# Patient Record
Sex: FEMALE | Race: WHITE | Employment: STUDENT | URBAN - METROPOLITAN AREA
[De-identification: names, ages, dates, MRNs, and addresses within clinical notes are randomized per-mention and may not be internally consistent; named-entity substitution may affect disease eponyms.]

---

## 2017-09-30 ENCOUNTER — OFFICE VISIT (OUTPATIENT)
Dept: URGENT CARE | Facility: URGENT CARE | Age: 22
End: 2017-09-30
Payer: COMMERCIAL

## 2017-09-30 ENCOUNTER — HOSPITAL ENCOUNTER (EMERGENCY)
Facility: CLINIC | Age: 22
Discharge: HOME | End: 2017-09-30
Attending: EMERGENCY MEDICINE | Admitting: EMERGENCY MEDICINE
Payer: COMMERCIAL

## 2017-09-30 ENCOUNTER — APPOINTMENT (OUTPATIENT)
Dept: CT IMAGING | Facility: CLINIC | Age: 22
End: 2017-09-30
Attending: EMERGENCY MEDICINE
Payer: COMMERCIAL

## 2017-09-30 VITALS
BODY MASS INDEX: 24.48 KG/M2 | HEIGHT: 62 IN | SYSTOLIC BLOOD PRESSURE: 134 MMHG | RESPIRATION RATE: 16 BRPM | DIASTOLIC BLOOD PRESSURE: 62 MMHG | OXYGEN SATURATION: 99 % | TEMPERATURE: 98.2 F | HEART RATE: 75 BPM | WEIGHT: 133 LBS

## 2017-09-30 VITALS
TEMPERATURE: 97.4 F | HEART RATE: 79 BPM | WEIGHT: 130 LBS | SYSTOLIC BLOOD PRESSURE: 116 MMHG | DIASTOLIC BLOOD PRESSURE: 70 MMHG

## 2017-09-30 DIAGNOSIS — J03.80 ACUTE BACTERIAL TONSILLITIS: ICD-10-CM

## 2017-09-30 DIAGNOSIS — J36 PERITONSILLAR ABSCESS: Primary | ICD-10-CM

## 2017-09-30 DIAGNOSIS — B96.89 ACUTE BACTERIAL TONSILLITIS: ICD-10-CM

## 2017-09-30 DIAGNOSIS — R07.0 THROAT PAIN: ICD-10-CM

## 2017-09-30 LAB
BASOPHILS # BLD AUTO: 0 10E9/L (ref 0–0.2)
BASOPHILS NFR BLD AUTO: 0.1 %
CREAT BLD-MCNC: 0.5 MG/DL (ref 0.52–1.04)
DEPRECATED S PYO AG THROAT QL EIA: NORMAL
DIFFERENTIAL METHOD BLD: ABNORMAL
EOSINOPHIL # BLD AUTO: 0.1 10E9/L (ref 0–0.7)
EOSINOPHIL NFR BLD AUTO: 0.5 %
ERYTHROCYTE [DISTWIDTH] IN BLOOD BY AUTOMATED COUNT: 13.3 % (ref 10–15)
GFR SERPL CREATININE-BSD FRML MDRD: >90 ML/MIN/1.7M2
HCT VFR BLD AUTO: 36 % (ref 35–47)
HETEROPH AB SER QL: NEGATIVE
HGB BLD-MCNC: 11.8 G/DL (ref 11.7–15.7)
LYMPHOCYTES # BLD AUTO: 2.2 10E9/L (ref 0.8–5.3)
LYMPHOCYTES NFR BLD AUTO: 12.6 %
MCH RBC QN AUTO: 29.5 PG (ref 26.5–33)
MCHC RBC AUTO-ENTMCNC: 32.8 G/DL (ref 31.5–36.5)
MCV RBC AUTO: 90 FL (ref 78–100)
MONOCYTES # BLD AUTO: 1.1 10E9/L (ref 0–1.3)
MONOCYTES NFR BLD AUTO: 6.6 %
NEUTROPHILS # BLD AUTO: 13.8 10E9/L (ref 1.6–8.3)
NEUTROPHILS NFR BLD AUTO: 80.2 %
PLATELET # BLD AUTO: 354 10E9/L (ref 150–450)
RBC # BLD AUTO: 4 10E12/L (ref 3.8–5.2)
SPECIMEN SOURCE: NORMAL
WBC # BLD AUTO: 17.2 10E9/L (ref 4–11)

## 2017-09-30 PROCEDURE — 70491 CT SOFT TISSUE NECK W/DYE: CPT

## 2017-09-30 PROCEDURE — 99285 EMERGENCY DEPT VISIT HI MDM: CPT | Mod: 25

## 2017-09-30 PROCEDURE — 10160 PNXR ASPIR ABSC HMTMA BULLA: CPT

## 2017-09-30 PROCEDURE — 96365 THER/PROPH/DIAG IV INF INIT: CPT | Mod: 59

## 2017-09-30 PROCEDURE — 25000125 ZZHC RX 250: Performed by: EMERGENCY MEDICINE

## 2017-09-30 PROCEDURE — 96375 TX/PRO/DX INJ NEW DRUG ADDON: CPT

## 2017-09-30 PROCEDURE — 86308 HETEROPHILE ANTIBODY SCREEN: CPT | Performed by: PHYSICIAN ASSISTANT

## 2017-09-30 PROCEDURE — 85025 COMPLETE CBC W/AUTO DIFF WBC: CPT | Performed by: PHYSICIAN ASSISTANT

## 2017-09-30 PROCEDURE — S0077 INJECTION, CLINDAMYCIN PHOSP: HCPCS | Performed by: EMERGENCY MEDICINE

## 2017-09-30 PROCEDURE — 36415 COLL VENOUS BLD VENIPUNCTURE: CPT | Performed by: PHYSICIAN ASSISTANT

## 2017-09-30 PROCEDURE — 25000128 H RX IP 250 OP 636: Performed by: EMERGENCY MEDICINE

## 2017-09-30 PROCEDURE — 87040 BLOOD CULTURE FOR BACTERIA: CPT | Performed by: EMERGENCY MEDICINE

## 2017-09-30 PROCEDURE — 82565 ASSAY OF CREATININE: CPT

## 2017-09-30 PROCEDURE — 87081 CULTURE SCREEN ONLY: CPT | Performed by: PHYSICIAN ASSISTANT

## 2017-09-30 PROCEDURE — 99203 OFFICE O/P NEW LOW 30 MIN: CPT | Performed by: PHYSICIAN ASSISTANT

## 2017-09-30 PROCEDURE — 87880 STREP A ASSAY W/OPTIC: CPT | Performed by: PHYSICIAN ASSISTANT

## 2017-09-30 RX ORDER — CLINDAMYCIN PHOSPHATE 900 MG/50ML
900 INJECTION, SOLUTION INTRAVENOUS ONCE
Status: COMPLETED | OUTPATIENT
Start: 2017-09-30 | End: 2017-09-30

## 2017-09-30 RX ORDER — ACETAMINOPHEN AND CODEINE PHOSPHATE 120; 12 MG/5ML; MG/5ML
5 SOLUTION ORAL EVERY 6 HOURS PRN
Qty: 70 ML | Refills: 0 | Status: SHIPPED | OUTPATIENT
Start: 2017-09-30 | End: 2018-10-15

## 2017-09-30 RX ORDER — CLINDAMYCIN HCL 300 MG
300 CAPSULE ORAL 4 TIMES DAILY
Qty: 40 CAPSULE | Refills: 0 | Status: SHIPPED | OUTPATIENT
Start: 2017-09-30 | End: 2017-10-10

## 2017-09-30 RX ORDER — IOPAMIDOL 755 MG/ML
80 INJECTION, SOLUTION INTRAVASCULAR ONCE
Status: COMPLETED | OUTPATIENT
Start: 2017-09-30 | End: 2017-09-30

## 2017-09-30 RX ORDER — DEXAMETHASONE SODIUM PHOSPHATE 10 MG/ML
10 INJECTION, SOLUTION INTRAMUSCULAR; INTRAVENOUS ONCE
Status: COMPLETED | OUTPATIENT
Start: 2017-09-30 | End: 2017-09-30

## 2017-09-30 RX ADMIN — IOPAMIDOL 80 ML: 755 INJECTION, SOLUTION INTRAVENOUS at 20:41

## 2017-09-30 RX ADMIN — SODIUM CHLORIDE 60 ML: 9 INJECTION, SOLUTION INTRAVENOUS at 20:40

## 2017-09-30 RX ADMIN — DEXAMETHASONE SODIUM PHOSPHATE 10 MG: 10 INJECTION, SOLUTION INTRAMUSCULAR; INTRAVENOUS at 19:37

## 2017-09-30 RX ADMIN — SODIUM CHLORIDE 1000 ML: 9 INJECTION, SOLUTION INTRAVENOUS at 19:37

## 2017-09-30 RX ADMIN — SODIUM CHLORIDE 1000 ML: 9 INJECTION, SOLUTION INTRAVENOUS at 21:21

## 2017-09-30 RX ADMIN — CLINDAMYCIN PHOSPHATE 900 MG: 18 INJECTION, SOLUTION INTRAVENOUS at 19:37

## 2017-09-30 ASSESSMENT — ENCOUNTER SYMPTOMS
WHEEZING: 0
FEVER: 0
VOMITING: 0
SHORTNESS OF BREATH: 0
SORE THROAT: 1
TROUBLE SWALLOWING: 1
NAUSEA: 0
ABDOMINAL PAIN: 0

## 2017-09-30 NOTE — ED AVS SNAPSHOT
Emergency Department    [address] [facility] [address]    Phone:  [phone]    Fax:  [phone]                                       Esperanza Vidales   MRN: [id number]    Department:   Emergency Department   Date of Visit:  9/30/2017           Patient Information     Date Of Birth          1995        Your diagnoses for this visit were:     Acute bacterial tonsillitis CT with possible early paratonsilar abscess as well, not able to be drained       You were seen by Vic Yuen MD.      Follow-up Information     Follow up with System, Provider Not In In 2 days.    Specialty:  Clinic    Why:  As needed, For wound re-check    Contact information:               Follow up with  Emergency Department.    Specialty:  EMERGENCY MEDICINE    Why:  If symptoms worsen    Contact information:    [address] [facility] [id number] [phone]      Discharge References/Attachments     PERITONSILLAR ABSCESS (ENGLISH)      24 Hour Appointment Hotline       To make an appointment at any [facility], call [phone] ([phone]). If you don't have a family doctor or clinic, we will help you find one. [place] clinics are conveniently located to serve the needs of you and your family.             Review of your medicines      START taking        Dose / Directions Last dose taken    acetaminophen-codeine 120-12 MG/5ML solution   Dose:  5 mL   Quantity:  70 mL        Take 5 mLs by mouth every 6 hours as needed for moderate pain   Refills:  0        clindamycin 300 MG capsule   Commonly known as:  CLEOCIN   Dose:  300 mg   Quantity:  40 capsule        Take 1 capsule (300 mg) by mouth 4 times daily for 10 days   Refills:  0          Our records show that you are taking the medicines listed below. If these are incorrect, please call your family doctor or clinic.        Dose / Directions Last dose taken    NEXPLANON 68 MG Impl   Dose:  1 each    Generic drug:  etonogestrel        1 each by Subdermal route once   Refills:  0                Prescriptions were sent or printed at these locations (2 Prescriptions)                   Other Prescriptions                Printed at Department/Unit printer (2 of 2)         clindamycin (CLEOCIN) 300 MG capsule               acetaminophen-codeine 120-12 MG/5ML solution                Procedures and tests performed during your visit     Procedure/Test Number of Times Performed    Blood culture ONE site 1    Creatinine POCT 2    Soft tissue neck CT w contrast 1      Orders Needing Specimen Collection     None      Pending Results     Date and Time Order Name Status Description    9/30/2017 1922 Blood culture ONE site In process     9/30/2017 1651 BETA STREP GROUP A CULTURE In process             Pending Culture Results     Date and Time Order Name Status Description    9/30/2017 1922 Blood culture ONE site In process     9/30/2017 1651 BETA STREP GROUP A CULTURE In process             Pending Results Instructions     If you had any lab results that were not finalized at the time of your Discharge, you can call the ED Lab Result RN at [phone]. You will be contacted by this team for any positive Lab results or changes in treatment. The nurses are available 7 days a week from 10A to 6:30P.  You can leave a message 24 hours per day and they will return your call.        Test Results From Your Hospital Stay        9/30/2017  7:43 PM         9/30/2017  9:24 PM      Narrative     CT SOFT TISSUE NECK WITH CONTRAST 9/30/2017 8:44 PM    HISTORY:  Neck pain and sore throat. Rule out abscess.    COMPARISON: None.    TECHNIQUE: CT soft tissue neck with 80 mL Isovue-370. Radiation dose  for this scan was reduced using automated exposure control, adjustment  of the mA and/or kV according to patient size, or iterative  reconstruction technique.     FINDINGS: There is a 2.1 x 2.3 cm somewhat ill-defined low-density  area in the  left peritonsillar region that has the appearance of an  early abscess or phlegmon. There are some prominent lymph nodes in  levels II, III and IV bilaterally likely reactive. Paranasal sinuses  are clear. Airways intact. Lung apices are clear.        Impression     IMPRESSION:   1. Phlegmonous change versus early peritonsillar abscess on the left.  2. Presumed reactive lymphadenopathy.         ROSA FOSTER MD         9/30/2017  8:31 PM      Component Results     Component Value Ref Range & Units Status    Creatinine 0.5 (L) 0.52 - 1.04 mg/dL Final    GFR Estimate >90 >60 mL/min/1.7m2 Final    GFR Estimate If Black >90 >60 mL/min/1.7m2 Final                Clinical Quality Measure: Blood Pressure Screening     Your blood pressure was checked while you were in the emergency department today. The last reading we obtained was  BP: 134/62 . Please read the guidelines below about what these numbers mean and what you should do about them.  If your systolic blood pressure (the top number) is less than 120 and your diastolic blood pressure (the bottom number) is less than 80, then your blood pressure is normal. There is nothing more that you need to do about it.  If your systolic blood pressure (the top number) is 120-139 or your diastolic blood pressure (the bottom number) is 80-89, your blood pressure may be higher than it should be. You should have your blood pressure rechecked within a year by a primary care provider.  If your systolic blood pressure (the top number) is 140 or greater or your diastolic blood pressure (the bottom number) is 90 or greater, you may have high blood pressure. High blood pressure is treatable, but if left untreated over time it can put you at risk for heart attack, stroke, or kidney failure. You should have your blood pressure rechecked by a primary care provider within the next 4 weeks.  If your provider in the emergency department today gave you specific instructions to follow-up with  "your doctor or provider even sooner than that, you should follow that instruction and not wait for up to 4 weeks for your follow-up visit.        Thank you for choosing [facility]       Thank you for choosing [facility] for your care. Our goal is always to provide you with excellent care. Hearing back from our patients is one way we can continue to improve our services. Please take a few minutes to complete the written survey that you may receive in the mail after you visit with us. Thank you!        [employer] Information     [facility] lets you send messages to your doctor, view your test results, renew your prescriptions, schedule appointments and more. To sign up, go to [email or web address] . Click on \"Log in\" on the left side of the screen, which will take you to the Welcome page. Then click on \"Sign up Now\" on the right side of the page.     You will be asked to enter the access code listed below, as well as some personal information. Please follow the directions to create your username and password.     Your access code is: [email or web address]  Expires: 2017  5:41 PM     Your access code will  in 90 days. If you need help or a new code, please call your [facility] clinic or [phone].        Care EveryWhere ID     This is your Care EveryWhere ID. This could be used by other organizations to access your [facility] medical records  [id number]        Equal Access to Services     [employer] [facility] : Hadgilda Kendall, waaxda steven, qaybta kaalwaldo diop. So [facility] [phone].    ATENCIÓN: Si habla español, tiene a mclean disposición servicios gratuitos de asistencia lingüística. Terence al [phone].    We comply with applicable federal civil rights laws and [place] laws. We do not discriminate on the basis of race, color, national origin, age, disability, sex, sexual orientation, or gender identity.            After Visit Summary       This is your record. " Keep this with you and show to your community pharmacist(s) and doctor(s) at your next visit.

## 2017-09-30 NOTE — NURSING NOTE
Chief Complaint   Patient presents with     Throat Pain     throat pain, pain when swallowing and left ear pain for a few days.        Initial /70  Pulse 79  Temp 97.4  F (36.3  C) (Oral)  Wt 130 lb (59 kg) There is no height or weight on file to calculate BMI.  Medication Reconciliation: complete

## 2017-09-30 NOTE — PROGRESS NOTES
SUBJECTIVE:   Esperanza Vidales is a 22 year old female presenting with a chief complaint of   1) sore throat for the past few days with left sided ear pain.  Denies any fevers or runny nose or cough  Onset of symptoms was as above.  Course of illness is worsening.    Severity moderate  Current and Associated symptoms: as above  Treatment measures tried include Tylenol/Ibuprofen.  Predisposing factors include None.    No past medical history on file.   Denies any significant PMH    There is no problem list on file for this patient.    Social History   Substance Use Topics     Smoking status: Never Smoker     Smokeless tobacco: Never Used     Alcohol use Not on file       ROS:  CONSTITUTIONAL:NEGATIVE for fever, chills, change in weight  INTEGUMENTARY/SKIN: NEGATIVE for worrisome rashes, moles or lesions  EYES: NEGATIVE for vision changes or irritation  ENT/MOUTH: as per HPI  RESP:NEGATIVE for significant cough or SOB  CV: NEGATIVE for chest pain, palpitations or peripheral edema  GI: NEGATIVE for nausea, abdominal pain, heartburn, or change in bowel habits  MUSCULOSKELETAL: NEGATIVE for significant arthralgias or myalgia    OBJECTIVE  :/70  Pulse 79  Temp 97.4  F (36.3  C) (Oral)  Wt 130 lb (59 kg)  GENERAL APPEARANCE: healthy, alert and no distress  EYES: EOMI,  PERRL, conjunctiva clear  HENT: ear canals and TM's normal.  Nose and mouth without ulcers, erythema or lesions.  Patient is unable to open her mouth more than a few of centimeters  HENT:left  tonsillar hypertrophy, tonsillar erythema and tonsillar exudate, uvula is deviated to right  NECK: supple, with left sided anterior cervical lymphadenopathy  RESP: lungs clear to auscultation - no rales, rhonchi or wheezes  CV: regular rates and rhythm, normal S1 S2, no murmur noted  ABDOMEN:  soft, nontender, no HSM or masses and bowel sounds normal  NEURO: Normal strength and tone, sensory exam grossly normal,  normal speech and mentation  SKIN: no  suspicious lesions or rashes    (J36) Peritonsillar abscess  (primary encounter diagnosis)  Comment: Discussed with Dr. Whaley at [facility]  Plan: TO [facility] ED for further evaluation and treatment.  ED informed.      (R07.0) Throat pain  Comment:   Plan: Rapid strep screen, Beta strep group A culture,        CBC with platelets and differential,         Mononucleosis screen          Patient expresses understanding and agreement with the assessment and plan as above.

## 2017-09-30 NOTE — PATIENT INSTRUCTIONS
(J36) Peritonsillar abscess  (primary encounter diagnosis)  Comment:   Plan: TO [facility] ED for further evaluation and treatment.  ED informed.      (R07.0) Throat pain  Comment:   Plan: Rapid strep screen, Beta strep group A culture,        CBC with platelets and differential,         Mononucleosis screen

## 2017-09-30 NOTE — ED AVS SNAPSHOT
Emergency Department    [address] [facility] [address]    Phone:  [phone]    Fax:  [phone]                                       Esperanza Vidales   MRN: [id number]    Department:   Emergency Department   Date of Visit:  9/30/2017           After Visit Summary Signature Page     I have received my discharge instructions, and my questions have been answered. I have discussed any challenges I see with this plan with the nurse or doctor.    ..........................................................................................................................................  Patient/Patient Representative Signature      ..........................................................................................................................................  Patient Representative Print Name and Relationship to Patient    ..................................................               ................................................  Date                                            Time    ..........................................................................................................................................  Reviewed by Signature/Title    ...................................................              ..............................................  Date                                                            Time

## 2017-09-30 NOTE — MR AVS SNAPSHOT
"              After Visit Summary   9/30/2017    Esperanza Vidales    MRN: [id number]           Patient Information     Date Of Birth          1995        Visit Information        Provider Department      9/30/2017 4:20 PM Madelyn Goel PA-C [facility]        Today's Diagnoses     Peritonsillar abscess    -  1    Throat pain          Care Instructions    (J36) Peritonsillar abscess  (primary encounter diagnosis)  Comment:   Plan: TO [facility] ED for further evaluation and treatment.  ED informed.      (R07.0) Throat pain  Comment:   Plan: Rapid strep screen, Beta strep group A culture,        CBC with platelets and differential,         Mononucleosis screen                      Follow-ups after your visit        Who to contact     If you have questions or need follow up information about today's clinic visit or your schedule please contact [facility] directly at [phone].  Normal or non-critical lab and imaging results will be communicated to you by MyChart, letter or phone within 4 business days after the clinic has received the results. If you do not hear from us within 7 days, please contact the clinic through [employer] or phone. If you have a critical or abnormal lab result, we will notify you by phone as soon as possible.  Submit refill requests through [employer] or call your pharmacy and they will forward the refill request to us. Please allow 3 business days for your refill to be completed.          Additional Information About Your Visit        [employer] Information     [employer] lets you send messages to your doctor, view your test results, renew your prescriptions, schedule appointments and more. To sign up, go to [email or web address] . Click on \"Log in\" on the left side of the screen, which will take you to the Welcome page. Then click on \"Sign up Now\" on the right side of the page.     You will be asked to enter the " access code listed below, as well as some personal information. Please follow the directions to create your username and password.     Your access code is: [email or web address]  Expires: 2017  5:41 PM     Your access code will  in 90 days. If you need help or a new code, please call your [facility] clinic or [phone].        Care EveryWhere ID     This is your Care EveryWhere ID. This could be used by other organizations to access your [facility] medical records  [id number]        Your Vitals Were     Pulse Temperature                79 97.4  F (36.3  C) (Oral)           Blood Pressure from Last 3 Encounters:   17 116/70    Weight from Last 3 Encounters:   17 130 lb (59 kg)              We Performed the Following     Beta strep group A culture     CBC with platelets and differential     Mononucleosis screen     Rapid strep screen        Primary Care Provider    None Specified       No primary provider on file.        Equal Access to Services     [facility]: Hadii mirian Kendall, waaxda ludayanna, qaybta kaalmada osmin, waldo rod . So [facility] [phone].    ATENCIÓN: Si habla español, tiene a mclean disposición servicios gratuitos de asistencia lingüística. Llame al [phone].    We comply with applicable federal civil rights laws and [place] laws. We do not discriminate on the basis of race, color, national origin, age, disability, sex, sexual orientation, or gender identity.            Thank you!     Thank you for choosing [facility]  for your care. Our goal is always to provide you with excellent care. Hearing back from our patients is one way we can continue to improve our services. Please take a few minutes to complete the written survey that you may receive in the mail after your visit with us. Thank you!             Your Updated Medication List - Protect others around you: Learn how to safely use, store and throw away  your medicines at www.disposemymeds.org.          This list is accurate as of: 9/30/17  5:41 PM.  Always use your most recent med list.                   Brand Name Dispense Instructions for use Diagnosis    NEXPLANON 68 MG Impl   Generic drug:  etonogestrel      1 each by Subdermal route once

## 2017-10-01 LAB
BACTERIA SPEC CULT: NORMAL
SPECIMEN SOURCE: NORMAL

## 2017-10-01 NOTE — ED PROVIDER NOTES
"  History     Chief Complaint:  Oral Swelling (sent from clinic for peritonsillar abscess.  States sx started 4 days ago with sore throat)      HPI   Esperanza Vidales is a 22 year old female who presents with concerns for oral swelling. For the past 4 days, the patient has been experiencing oral swelling. Earlier today the patient was seen in [facility] urgent care and was referred here. She has slight pain when turning her head as well as swallowing and talking. She denies fevers, nausea, vomiting, abdominal pain, wheezing, trouble breathing, and recent dental procedures.     Allergies:  Latex    Medications:    Nexpalnon     Past Medical History:    The patient denies any relevant past medical history.    Past Surgical History:    History reviewed. No pertinent past surgical history.    Family History:    The patient denies any relevant family medical history.    Social History:  Smoking Status: never  Smokeless Tobacco: never  Alcohol Use: yes    Marital Status:  Single [1]  Review of Systems   Constitutional: Negative for fever.   HENT: Positive for sore throat and trouble swallowing.    Respiratory: Negative for shortness of breath and wheezing.    Gastrointestinal: Negative for abdominal pain, nausea and vomiting.   All other systems reviewed and are negative.      Physical Exam   First Vitals:  BP: 134/62  Pulse: 75  Temp: 98.2  F (36.8  C)  Resp: 16  Height: 157.5 cm (5' 2\")  Weight: 60.3 kg (133 lb)  SpO2: 99 %    Physical Exam  General: Seen lying in bed, well appearing, alert and pleasant  Eyes: Tracking well, clear conj BL  ENT- Oropharynx with left greater than right tonsillar inflammation, does have associated exudate on her left tonsil as well. Does have mild area of swelling to the anterior left tonsil as well, but no fluctuance noted.  Patient has no pain to palpation of her submandibular lymph nodes, but does have mild trismus.  No evidence of airway obstruction, patient does have somewhat " muffled voice, but no stridor and no impending obstruction.  CV: No JVD, no pallor  Resp: no tachypnea, speaking in full sentences   Skin: no rashes seen, no e/o trauma  Neuro: DORSEY spontaneously          Emergency Department Course   Imaging:  Radiographic findings were communicated with the patient who voiced understanding of the findings.  CT Soft tissue neck with contrast:   IMPRESSION:   1. Phlegmonous change versus early peritonsillar abscess on the left.  2. Presumed reactive lymphadenopathy. As per radiology.    Laboratory:  Creatinine POCT: 0.5 (L)  Blood culture ONE site: in process    Procedures:  3 ASPIRATION ATTEMPTS at slightly different sights in the ant tonsillar pillar. No purulence noted.     Interventions:   Decadron, 10 mg, IV   NS, 1 L, IV   Cleocin, 900 mg, IV    Emergency Department Course:  Nursing notes and vitals reviewed. I performed an exam of the patient as documented above.     IV inserted. Medicine administered as documented above. Blood drawn. This was sent to the lab for further testing, results above.    The patient was taken for a CT of the neck, see imaging results above.      I rechecked the patient and discussed the results of her workup thus far.      I rechecked the patient and discussed the results of her workup thus far.     Findings and plan explained to the Patient. Patient discharged home with instructions regarding supportive care, medications, and reasons to return. The importance of close follow-up was reviewed. The patient was prescribed cleocin and acetaminophen-codeine.    I personally reviewed the laboratory results with the Patient and answered all related questions prior to discharge.     Impression & Plan    Medical Decision Makin y/o female who presents tot he ED with asymmetric swelling likely due to tonsillar cellulitis vs. early abscess. The patient is protecting her airway here and has received IV antibiotics and IV steroids and CT  read is phlegmonous changes vs early PTA. Unable to drain after 3 attempts in different locations and patient was very compliant with exam, leading me to believe that this may be more of a phlegmon than a PTA and will be treated accordingly. Will plan for IV therapy here and wound check in the next 2-3 days with her PCP, I do feel she is stable to try her antibiotics at home orally as she has no evidence of severe sepsis here. Normal vital signs. I do appreciate leukocytosis done at outside clinic, which is likely consistent with the patient's oral cellulitis/phlegmon. Has good support at home, knows when to come back and can follow up safely with her PCP in 48 hours.     Diagnosis:    ICD-10-CM    1. Acute bacterial tonsillitis J03.80     B96.89     CT with possible early paratonsilar abscess as well, not able to be drained       Disposition:  discharged to home    Discharge Medications:  Discharge Medication List as of 9/30/2017  9:39 PM      START taking these medications    Details   clindamycin (CLEOCIN) 300 MG capsule Take 1 capsule (300 mg) by mouth 4 times daily for 10 days, Disp-40 capsule, R-0, Local Print      acetaminophen-codeine 120-12 MG/5ML solution Take 5 mLs by mouth every 6 hours as needed for moderate pain, Disp-70 mL, R-0, Local Print             I, Jonatan Oneill, am serving as a scribe on 9/30/2017 at 7:04 PM to personally document services performed by Vic Yuen* based on my observations and the provider's statements to me.     Jonatan Oneill  9/30/2017    EMERGENCY DEPARTMENT       Vic Yuen MD  09/30/17 8801

## 2017-10-06 LAB
BACTERIA SPEC CULT: NO GROWTH
Lab: NORMAL
SPECIMEN SOURCE: NORMAL

## 2018-07-02 ENCOUNTER — TRANSFERRED RECORDS (OUTPATIENT)
Dept: HEALTH INFORMATION MANAGEMENT | Facility: CLINIC | Age: 23
End: 2018-07-02

## 2018-08-27 NOTE — TELEPHONE ENCOUNTER
FUTURE VISIT INFORMATION      FUTURE VISIT INFORMATION:    Date: 09/06/2018    Time: 9:00    Location: [facility]  REFERRAL INFORMATION:    Referring provider:  SELF    Referring providers clinic:  N/A    Reason for visit/diagnosis  TONSILLITIS    RECORDS REQUESTED FROM:       Clinic name Comments Records Status Imaging Status   [facility]                                      RECORDS STATUS

## 2018-08-27 NOTE — TELEPHONE ENCOUNTER
Phone Call:     Contact Name [facility]   Outcome CALLED CLINIC TO GET RECORDS FAXED OVER THEY NEEDED A COVER LETTER FAX TO THEM FAX# [phone]

## 2018-08-29 NOTE — TELEPHONE ENCOUNTER
Action    Action Taken JOCELYN- CLINIC CALLED THEY NOW NEED JOCELYN BEFORE THEY WILL SEND US RECORDS MAILED OUT TO PT AND LEFT HER A VM THAT WE NEED ASAP

## 2018-09-05 NOTE — TELEPHONE ENCOUNTER
Phone Call:     Contact Name CALLED PT   Outcome INFORMED HER WE ARE STILL NEEDING THE JOCELYN THAT WAS MAILED TO HER SHE WILL LOOK FOR IT AND SEND IT BACK TO US TODAY... INFORMED HER WE NEED TO GET THE RECORDS TODAY SINCE YOUR APPT IS TOMORROW AND THAT DR BLAKE WILL WANT TO REVIEW THOSE RECORDS PRIOR TO YOU COMING IN

## 2018-09-06 ENCOUNTER — OFFICE VISIT (OUTPATIENT)
Dept: OTOLARYNGOLOGY | Facility: CLINIC | Age: 23
End: 2018-09-06
Payer: COMMERCIAL

## 2018-09-06 ENCOUNTER — PRE VISIT (OUTPATIENT)
Dept: OTOLARYNGOLOGY | Facility: CLINIC | Age: 23
End: 2018-09-06

## 2018-09-06 DIAGNOSIS — J35.01 CHRONIC TONSILLITIS: Primary | ICD-10-CM

## 2018-09-06 DIAGNOSIS — J35.1 HYPERTROPHY OF TONSILS: ICD-10-CM

## 2018-09-06 ASSESSMENT — PAIN SCALES - GENERAL: PAINLEVEL: NO PAIN (0)

## 2018-09-06 NOTE — LETTER
9/6/2018       RE: Esperanza Vidales  [address]  [facility] [address]     Dear Colleague,    Thank you for referring your patient, Esperanza Vidales, to the [facility] at [facility]. Please see a copy of my visit note below.    The patient presents with a history of chronic tonsillitis and tonsil hypertrophy.  The patient has had chronic problems with these difficulties over the past two years and she recently was treated in [place] for a severe acute tonsillitis.  The patient responds to medical therapy with antibiotics, but the patient's symptoms will soon recur after the medications are completed.  The patient denies sinusitis, rhinitis, facial pain, nasal obstruction or purulent nasal discharge. The patient denies otalgia, otorrhea, eustachian tube dysfunction, ear infections, dizziness or tinnitus.     This patient is seen in consultation as a self referral to my clinic.    All other systems were reviewed and they are either negative or they are not directly pertinent to this Otolaryngology examination.      Past Medical History:    History reviewed. No pertinent past medical history.    Past Surgical History:    History reviewed. No pertinent surgical history.    Medications:      Current Outpatient Prescriptions:      acetaminophen-codeine 120-12 MG/5ML solution, Take 5 mLs by mouth every 6 hours as needed for moderate pain, Disp: 70 mL, Rfl: 0     etonogestrel (NEXPLANON) 68 MG IMPL, 1 each by Subdermal route once, Disp: , Rfl:     Allergies:    Latex    Physical Examination:    The patient is a well developed, well nourished female in no apparent distress.  She is normocephalic, atraumatic with pupils equally round and reactive to light.    Oral Cavity Examination: Norml mucosa with no masses or lesions.  The tonsils are 3+ and cryptic  Nasal Examination: Normal mucosa with no masses or lesions  Ear Examination: Ear canals clear,  tympanic membranes and middle ears normal  Neurological: Facial nerve function intact and symmetric  Integumentary: No lesions on the skin of the head or neck  Neck: No masses or lesions, no lymphadenopathy  Endocrine: Normal thyroid    Assessment and Plan:    The patient presents with a history of chronic tonsillitis and tonsillar hypertrophy.  The patient and I have discussed medical and surgical treatment alternatives. She will gargle with CREST mouth rinse gargles twice daily for two months and she will be seen again after this period to determine whether further management is indicated.         Again, thank you for allowing me to participate in the care of your patient.      Sincerely,    Leroy Khan MD

## 2018-09-06 NOTE — MR AVS SNAPSHOT
After Visit Summary   2018    Esperanza Vidales    MRN: [id number]           Patient Information     Date Of Birth          1995        Visit Information        Provider Department      2018 9:00 AM Leroy Khan MD [facility]        Today's Diagnoses     Chronic tonsillitis    -  1    Hypertrophy of tonsils          Care Instructions    The patient presents with a history of chronic tonsillitis and tonsillar hypertrophy.  The patient and I have discussed medical and surgical treatment alternatives. She will gargle with CREST mouth rinse gargles twice daily for two months and she will be seen again after this period to determine whether further management is indicated.           Follow-ups after your visit        Your next 10 appointments already scheduled     2018  8:30 AM CDT   (Arrive by 8:15 AM)   Return Visit with MD MARIA INES [facility] ([facility])    [address] [place] [id number] [phone]              Who to contact     Please call your clinic at [phone] to:    Ask questions about your health    Make or cancel appointments    Discuss your medicines    Learn about your test results    Speak to your doctor            Additional Information About Your Visit        MyChart Information     NudgeRxhart is an electronic gateway that provides easy, online access to your medical records. With [employer], you can request a clinic appointment, read your test results, renew a prescription or communicate with your care team.     To sign up for NudgeRxhart visit the website at [email or web address]   You will be asked to enter the access code listed below, as well as some personal information. Please follow the directions to create your username and password.     Your access code is: [email or web address]  Expires: 2018  6:30 AM     Your access code will   in 90 days. If you need help or a new code, please contact your [facility] or call [phone] for assistance.        Care EveryWhere ID     This is your Care EveryWhere ID. This could be used by other organizations to access your [facility] medical records  [id number]         Blood Pressure from Last 3 Encounters:   09/30/17 134/62   09/30/17 116/70    Weight from Last 3 Encounters:   09/30/17 60.3 kg (133 lb)   09/30/17 59 kg (130 lb)              Today, you had the following     No orders found for display       Primary Care Provider Fax #    Physician No Ref-Primary [phone]       No address on file        Equal Access to Services     JUANY WATTS : Ayana Kendall, yao harris, aaron kaalmada osmin, waldo rod . So [facility] [phone].    ATENCIÓN: Si habla español, tiene a mclean disposición servicios gratuitos de asistencia lingüística. Llame al [phone].    We comply with applicable federal civil rights laws and [place] laws. We do not discriminate on the basis of race, color, national origin, age, disability, sex, sexual orientation, or gender identity.            Thank you!     Thank you for choosing [facility]  for your care. Our goal is always to provide you with excellent care. Hearing back from our patients is one way we can continue to improve our services. Please take a few minutes to complete the written survey that you may receive in the mail after your visit with us. Thank you!             Your Updated Medication List - Protect others around you: Learn how to safely use, store and throw away your medicines at www.disposemymeds.org.          This list is accurate as of 9/6/18  9:29 AM.  Always use your most recent med list.                   Brand Name Dispense Instructions for use Diagnosis    acetaminophen-codeine 120-12 MG/5ML solution     70 mL    Take 5 mLs by mouth every 6 hours as  needed for moderate pain        NEXPLANON 68 MG Impl   Generic drug:  etonogestrel      1 each by Subdermal route once

## 2018-09-06 NOTE — PATIENT INSTRUCTIONS
The patient presents with a history of chronic tonsillitis and tonsillar hypertrophy.  The patient and I have discussed medical and surgical treatment alternatives. She will gargle with CREST mouth rinse gargles twice daily for two months and she will be seen again after this period to determine whether further management is indicated.

## 2018-09-06 NOTE — LETTER
Date:September 7, 2018      Patient was self referred, no letter generated. Do not send.        [facility] Information

## 2018-10-15 ENCOUNTER — HOSPITAL ENCOUNTER (EMERGENCY)
Facility: CLINIC | Age: 23
Discharge: LEFT BEFORE DISCHARGE | End: 2018-10-15
Payer: COMMERCIAL

## 2018-10-15 VITALS
HEIGHT: 62 IN | BODY MASS INDEX: 26.13 KG/M2 | RESPIRATION RATE: 16 BRPM | DIASTOLIC BLOOD PRESSURE: 67 MMHG | OXYGEN SATURATION: 100 % | SYSTOLIC BLOOD PRESSURE: 126 MMHG | WEIGHT: 142 LBS | TEMPERATURE: 98.3 F

## 2018-10-15 NOTE — ED TRIAGE NOTES
Transferred from clinic with possible peritonsillar abscess. Patient when to clinic with c/o pharyngitis and dysphagia.  Denies SOB.

## 2018-10-15 NOTE — ED NOTES
Bed: IN01  Expected date:   Expected time:   Means of arrival:   Comments:  Olena Vidales 23 yof r/o PTA from [facility] ETA 3:40 p

## 2018-10-16 ENCOUNTER — HOSPITAL ENCOUNTER (EMERGENCY)
Facility: CLINIC | Age: 23
Discharge: HOME | End: 2018-10-16
Attending: EMERGENCY MEDICINE | Admitting: EMERGENCY MEDICINE
Payer: COMMERCIAL

## 2018-10-16 VITALS
BODY MASS INDEX: 26.13 KG/M2 | RESPIRATION RATE: 18 BRPM | WEIGHT: 142 LBS | TEMPERATURE: 98.2 F | HEART RATE: 92 BPM | DIASTOLIC BLOOD PRESSURE: 72 MMHG | OXYGEN SATURATION: 98 % | SYSTOLIC BLOOD PRESSURE: 124 MMHG | HEIGHT: 62 IN

## 2018-10-16 DIAGNOSIS — J36 PERITONSILLAR ABSCESS: ICD-10-CM

## 2018-10-16 PROCEDURE — 25000128 H RX IP 250 OP 636: Performed by: EMERGENCY MEDICINE

## 2018-10-16 PROCEDURE — 25000125 ZZHC RX 250

## 2018-10-16 PROCEDURE — 99284 EMERGENCY DEPT VISIT MOD MDM: CPT | Mod: 25

## 2018-10-16 PROCEDURE — 96372 THER/PROPH/DIAG INJ SC/IM: CPT

## 2018-10-16 PROCEDURE — 10160 PNXR ASPIR ABSC HMTMA BULLA: CPT

## 2018-10-16 RX ORDER — DEXAMETHASONE SODIUM PHOSPHATE 4 MG/ML
10 INJECTION, SOLUTION INTRA-ARTICULAR; INTRALESIONAL; INTRAMUSCULAR; INTRAVENOUS; SOFT TISSUE ONCE
Status: COMPLETED | OUTPATIENT
Start: 2018-10-16 | End: 2018-10-16

## 2018-10-16 RX ORDER — CLINDAMYCIN HCL 300 MG
300 CAPSULE ORAL 4 TIMES DAILY
Qty: 28 CAPSULE | Refills: 0 | Status: SHIPPED | OUTPATIENT
Start: 2018-10-16 | End: 2018-10-23

## 2018-10-16 RX ADMIN — DEXAMETHASONE SODIUM PHOSPHATE 4 MG: 4 INJECTION, SOLUTION INTRAMUSCULAR; INTRAVENOUS at 09:44

## 2018-10-16 RX ADMIN — TOPICAL ANESTHETIC 0.5 ML: 200 SPRAY DENTAL; PERIODONTAL at 09:21

## 2018-10-16 RX ADMIN — LIDOCAINE HYDROCHLORIDE 10 ML: 20 SOLUTION ORAL; TOPICAL at 09:17

## 2018-10-16 ASSESSMENT — ENCOUNTER SYMPTOMS
VOICE CHANGE: 0
FEVER: 0
COUGH: 0

## 2018-10-16 NOTE — ED PROVIDER NOTES
"  History     Chief Complaint:  Throat pain     HPI:   The history is provided by the patient.      Esperanza Vidales is a 23 year old female who presents to the emergency department with a male  for evaluation of throat pain. The patient had onset of left-sided throat pain on Friday (4 days ago). Pain is exacerbated with eating. She presented to urgent care yesterday and was told she had a peritonsillar abscess. She was not given antibiotics, rather, was recommended to the emergency room. However due to long wait times she left before being evaluated. Here, the patient rates her pain a 7/10 in severity. She did have this problem last year and notes that it is not as bad this time. She recovered well on antibiotics and Decadron. She denies any fever, productive cough, or voice change and has no other complaints.     Allergies:  No known drug allergies      Medications:    Nexplanon      Past Medical History:    The patient does not have any past pertinent medical history.     Past Surgical History:    History reviewed. No pertinent surgical history.     Family History:    History reviewed. No pertinent family history.      Social History:  Presents with male     Tobacco use: Never smoker   Alcohol use: Occasional   Marital Status:  Single      Review of Systems   Constitutional: Negative for fever.   HENT: Negative for voice change.         Throat pain   Respiratory: Negative for cough.    All other systems reviewed and are negative.    Physical Exam     Patient Vitals for the past 24 hrs:   BP Temp Temp src Pulse Resp SpO2 Height Weight   10/16/18 0931 - - - - - 98 % - -   10/16/18 0930 124/72 - - - - - - -   10/16/18 0850 121/72 98.2  F (36.8  C) Oral 92 18 99 % 1.575 m (5' 2\") 64.4 kg (142 lb)        Physical Exam  Vitals: reviewed by me  General: Pt seen on [facility], pleasant, cooperative, and alert to conversation  Eyes: Tracking well, clear conjunctiva BL  ENT: MMM, midline " trachea.  Uvula deviated to right.  Large fluctuant mass, roughly 1-2 cm noted in the superior aspect of the anterior tonsillar pillar.  No area obstruction.  No stridor.  Lungs: No tachypnea, no accessory muscle use. No respiratory distress.   CV: Rate as above, regular rhythm.    MSK: no peripheral edema or joint effusion.  No evidence of trauma  Skin: No rash, normal turgor and temperature  Neuro: Clear speech and no facial droop.  Psych: Not RIS, no e/o AH/VH      Emergency Department Course     Procedures:    Peritonsillar Abscess Incision and Drainage Procedure Note:     Procedure:  Needle aspiration of peritonsillar abscess    Indication:  Left peritonsillar abscess    Consent:  Risks (including but not limited to: bleeding, pain, aspiration, carotid artery injury), benefits and alternatives were discussed with  patient and consent for procedure was obtained.    Timeout:  Universal protocol was followed. TIME OUT conducted just prior to starting procedure confirmed patient identity, site/side, procedure, patient position, and availability of correct equipment and implants.?  Yes    Anesthesia:  Topical anesthesia with Cetacaine spray, followed by local infiltration using viscous lidocaine as well as topical benzocaine    Performed by: Rom Yuen MD     Procedure:  Abscess site was correctly identified.  Using an 18-gauge needle with its protective covering in place (end of cover trimmed, exposing 1 cm of distal aspect of needle), the site of maximal fluctuance was entered.  Aspiration removed approximately 5 mLs of yellow, thick, purulent material.  Needle was removed and disposed appropriately.    Patient Status:  Patient tolerated the procedure well.  There were no complications.    Interventions:  0917: Lidocaine 2 % solution 10 ml   0921: Benzocaine 20 % spray 0.5 ml   0944: Decadron 4 mg IM       Emergency Department Course:  Past medical records, nursing notes, and vitals reviewed.  0903: I  performed an exam of the patient and obtained history, as documented above.    Above interventions provided.      Above procedures given.      0927: I rechecked the patient.  Findings and plan explained to the Patient and significant other. Patient discharged home with instructions regarding supportive care, medications, and reasons to return. The importance of close follow-up was reviewed.      Impression & Plan      Medical Decision Making:  Esperanza Vidales is a 23 year old female who presents to the emergency room with what appears to be a left-sided peritonsillar abscess. I believe this diagnosis is supported my clinical gestalt as well as pus that was aspirated from her anterior tonsillar pillar. She tolerated the procedure well, see procedure note above. No air way obstruction, otherwise clinically well, given Decadron here, and then a prescription for Clindamycin. She can follow safely with her primary care doctor in  48 hours for routine follow-up and knows to come back to the emergency department with any other issues. Interesting that she has had two of these in the last year, will encourage her to follow-up with her regular doctor to pursue ENT evaluation if indicated.     Diagnosis:    ICD-10-CM    1. Peritonsillar abscess J36        Disposition:  Discharged to home with plan as outlined.     Discharge Medications:  New Prescriptions    CLINDAMYCIN (CLEOCIN) 300 MG CAPSULE    Take 1 capsule (300 mg) by mouth 4 times daily for 7 days     Scribe Disclosure:   IMarty, am serving as a scribe at 9:03 AM on 10/16/2018 to document services personally performed by Vic Yuen MD based on my observations and the provider's statements to me.       Vic Yuen MD  10/16/2018    EMERGENCY DEPARTMENT       Vic Yuen MD  10/16/18 2473

## 2018-10-16 NOTE — ED AVS SNAPSHOT
Emergency Department    [address] [facility] [address]    Phone:  [phone]    Fax:  [phone]                                       Esperanza iVdales   MRN: [id number]    Department:   Emergency Department   Date of Visit:  10/16/2018           Patient Information     Date Of Birth          1995        Your diagnoses for this visit were:     Peritonsillar abscess        You were seen by Vic Yuen MD.      Follow-up Information     Follow up with No Ref-Primary, Physician. Schedule an appointment as soon as possible for a visit in 3 days.    Why:  As needed, For repeat evaluation and symptom check, For wound re-check        Follow up with  Emergency Department.    Specialty:  EMERGENCY MEDICINE    Why:  If symptoms worsen    Contact information:    [address] [facility] [id number] [phone]        Discharge Instructions         Peritonsillar Abscess    A peritonsillar abscess is a collection of pus that forms near the tonsils. It is a complication of a bacterial infection of the tonsils (tonsillitis). The abscess causes one or both tonsils to swell. The infection and swelling may spread to nearby tissues. If tissues swell enough to block the throat, the condition can become life-threatening. It is also dangerous if the abscess bursts and the infection spreads or is breathed into the lungs. The goal is to treat a peritonsillar abscess before it worsens and threatens your health.  Signs and symptoms of peritonsillar abscess    Severe sore throat (often worse on one side)    Swollen and enlarged tonsils    Fever and chills    Pain when swallowing or trouble opening the mouth    Voice changes     Drooling    Swollen or tender glands in the neck  Diagnosing peritonsillar abscess  Your healthcare provider will examine you and look inside your mouth and throat. You will be asked about your symptoms and health history. Tests or  procedures may be done as well, including those listed below.    Throat swab. This test checks for infection. It is done by wiping a sterile cotton swab in the back of the throat. The swab is then sent to a lab for study.    Blood tests. These might be done to check how your body is responding to the infection.    Ultrasound or computed tomography (CT) scans. These tests provide images of the abscess. They also help rule out other problems.    Needle aspiration. This procedure removes a sample of pus from the abscess with a needle. The sample is then sent to a lab to check for infection. In some cases, all of the pus is removed from the abscess.  Treating peritonsillar abscess  The abscess itself can be treated. Treatment of the underlying infection is also needed. Common treatments are listed below.    Medicines. Antibiotics are needed to treat the underlying infection. These may be taken by mouth or given by IV. Pain relievers may also be given, if needed.    Drainage of the abscess. A procedure may be needed to drain the pus from the abscess. Pus may be removed from the abscess with a needle (needle aspiration). Or, a small incision is made in the abscess. The pus is then drained and suctioned from the throat and mouth. This is called incision and drainage.    Tonsillectomy. This is surgery to remove the tonsils. It may be done if the abscess does not improve with medicines. It may also be done if you have frequent tonsil infections or abscesses.  Recovery and follow-up  Treating the bacterial infection generally relieves the problem. Once the infection resolves, you should recover completely. Follow up with your healthcare provider as directed. And if you develop another throat infection, see your healthcare provider promptly.  Date Last Reviewed: 11/1/2016 2000-2017 The [email or web address]. [address] [place] [address], [place] [address]. All rights reserved. This information is not intended as a substitute for  professional medical care. Always follow your healthcare professional's instructions.          Peritonsillar Abscess  You (or your child) has an abscess (collection of pus) around the tonsils. This abscess can cause severe sore throat, pain with swallowing, fever, drooling, and trouble opening the mouth.  The abscess is treated with antibiotics. These may be started using an IV (intravenous line), then continued by mouth. In most cases, the abscess will also be drained.  Home care    All of the antibiotics should be taken as prescribed until they are gone. This is true even if symptoms start to get better. This is very important to ensure that the infection goes away. This infection can lead to serious complications.    Pain medicines should be taken as directed.    To help ease pain, children older than 6 years and adults can gargle with warm salt water four times a day for the first 2 days. Dissolve 1/2 teaspoon of salt in 1 glass of hot water. Gargle with the solution then spit it out. (Ensure that children do not swallow the salt water.)    Cool liquids and soft foods may make eating easier for the first few days.  Follow-up care  Follow up with a healthcare provider or as advised within 1-2 days.   When to seek medical advice  Call the healthcare provider right away if any of the following occur:    Fever of 100.4 F (38 C) or higher after 3 days of treatment    Symptoms that get worse    Symptoms that go away and come back    Trouble swallowing liquids or taking medicine    Trouble breathing    Neck stiffness    Bleeding    Rash    Swelling or bumps in the neck  Date Last Reviewed: 10/1/2017    1392-3164 The [employer]. [address] [place] [address], [place] [address]. All rights reserved. This information is not intended as a substitute for professional medical care. Always follow your healthcare professional's instructions.          Your next 10 appointments already scheduled     Nov 01, 2018  8:30 AM CDT    (Arrive by 8:15 AM)   Return Visit with Leroy Khan MD   [facility] ([facility] [place])    [address] [facility]  4th Floor  [facility] [address] [phone]              24 Hour Appointment Hotline       To make an appointment at any [facility], call [phone] ([phone]). If you don't have a family doctor or clinic, we will help you find one. [facility] are conveniently located to serve the needs of you and your family.             Review of your medicines      START taking        Dose / Directions Last dose taken    clindamycin 300 MG capsule   Commonly known as:  CLEOCIN   Dose:  300 mg   Quantity:  28 capsule        Take 1 capsule (300 mg) by mouth 4 times daily for 7 days   Refills:  0          Our records show that you are taking the medicines listed below. If these are incorrect, please call your family doctor or clinic.        Dose / Directions Last dose taken    NEXPLANON 68 MG Impl   Dose:  1 each   Generic drug:  etonogestrel        1 each by Subdermal route once   Refills:  0                Prescriptions were sent or printed at these locations (1 Prescription)                   Other Prescriptions                Printed at Department/Unit printer (1 of 1)         clindamycin (CLEOCIN) 300 MG capsule                Orders Needing Specimen Collection     None      Pending Results     No orders found from 10/14/2018 to 10/17/2018.            Pending Culture Results     No orders found from 10/14/2018 to 10/17/2018.            Pending Results Instructions     If you had any lab results that were not finalized at the time of your Discharge, you can call the ED Lab Result RN at [phone]. You will be contacted by this team for any positive Lab results or changes in treatment. The nurses are available 7 days a week from 10A to 6:30P.  You can leave a message 24 hours per day and they will return your call.        Test Results  From Your Hospital Stay               Clinical Quality Measure: Blood Pressure Screening     Your blood pressure was checked while you were in the emergency department today. The last reading we obtained was  BP: 121/72 . Please read the guidelines below about what these numbers mean and what you should do about them.  If your systolic blood pressure (the top number) is less than 120 and your diastolic blood pressure (the bottom number) is less than 80, then your blood pressure is normal. There is nothing more that you need to do about it.  If your systolic blood pressure (the top number) is 120-139 or your diastolic blood pressure (the bottom number) is 80-89, your blood pressure may be higher than it should be. You should have your blood pressure rechecked within a year by a primary care provider.  If your systolic blood pressure (the top number) is 140 or greater or your diastolic blood pressure (the bottom number) is 90 or greater, you may have high blood pressure. High blood pressure is treatable, but if left untreated over time it can put you at risk for heart attack, stroke, or kidney failure. You should have your blood pressure rechecked by a primary care provider within the next 4 weeks.  If your provider in the emergency department today gave you specific instructions to follow-up with your doctor or provider even sooner than that, you should follow that instruction and not wait for up to 4 weeks for your follow-up visit.        Thank you for choosing [facility]       Thank you for choosing [facility] for your care. Our goal is always to provide you with excellent care. Hearing back from our patients is one way we can continue to improve our services. Please take a few minutes to complete the written survey that you may receive in the mail after you visit with us. Thank you!        [employer] Information     [employer] lets you send messages to your doctor, view your test results, renew your prescriptions, schedule  "appointments and more. To sign up, go to [email or web address] . Click on \"Log in\" on the left side of the screen, which will take you to the Welcome page. Then click on \"Sign up Now\" on the right side of the page.     You will be asked to enter the access code listed below, as well as some personal information. Please follow the directions to create your username and password.     Your access code is: [email or web address]  Expires: 2018  6:30 AM     Your access code will  in 90 days. If you need help or a new code, please call your [facility] clinic or [phone].        Care EveryWhere ID     This is your Care EveryWhere ID. This could be used by other organizations to access your [facility] medical records  [id number]        Equal Access to Services     JUANY WATTS : Ayana Kendall, yao harris, aaron solorio, waldo chang. So [facility] [phone].    ATENCIÓN: Si habla español, tiene a mclean disposición servicios gratuitos de asistencia lingüística. Llame al [phone].    We comply with applicable federal civil rights laws and [place] laws. We do not discriminate on the basis of race, color, national origin, age, disability, sex, sexual orientation, or gender identity.            After Visit Summary       This is your record. Keep this with you and show to your community pharmacist(s) and doctor(s) at your next visit.                  "

## 2018-10-16 NOTE — DISCHARGE INSTRUCTIONS
Peritonsillar Abscess    A peritonsillar abscess is a collection of pus that forms near the tonsils. It is a complication of a bacterial infection of the tonsils (tonsillitis). The abscess causes one or both tonsils to swell. The infection and swelling may spread to nearby tissues. If tissues swell enough to block the throat, the condition can become life-threatening. It is also dangerous if the abscess bursts and the infection spreads or is breathed into the lungs. The goal is to treat a peritonsillar abscess before it worsens and threatens your health.  Signs and symptoms of peritonsillar abscess    Severe sore throat (often worse on one side)    Swollen and enlarged tonsils    Fever and chills    Pain when swallowing or trouble opening the mouth    Voice changes     Drooling    Swollen or tender glands in the neck  Diagnosing peritonsillar abscess  Your healthcare provider will examine you and look inside your mouth and throat. You will be asked about your symptoms and health history. Tests or procedures may be done as well, including those listed below.    Throat swab. This test checks for infection. It is done by wiping a sterile cotton swab in the back of the throat. The swab is then sent to a lab for study.    Blood tests. These might be done to check how your body is responding to the infection.    Ultrasound or computed tomography (CT) scans. These tests provide images of the abscess. They also help rule out other problems.    Needle aspiration. This procedure removes a sample of pus from the abscess with a needle. The sample is then sent to a lab to check for infection. In some cases, all of the pus is removed from the abscess.  Treating peritonsillar abscess  The abscess itself can be treated. Treatment of the underlying infection is also needed. Common treatments are listed below.    Medicines. Antibiotics are needed to treat the underlying infection. These may be taken by mouth or given by IV. Pain  relievers may also be given, if needed.    Drainage of the abscess. A procedure may be needed to drain the pus from the abscess. Pus may be removed from the abscess with a needle (needle aspiration). Or, a small incision is made in the abscess. The pus is then drained and suctioned from the throat and mouth. This is called incision and drainage.    Tonsillectomy. This is surgery to remove the tonsils. It may be done if the abscess does not improve with medicines. It may also be done if you have frequent tonsil infections or abscesses.  Recovery and follow-up  Treating the bacterial infection generally relieves the problem. Once the infection resolves, you should recover completely. Follow up with your healthcare provider as directed. And if you develop another throat infection, see your healthcare provider promptly.  Date Last Reviewed: 11/1/2016 2000-2017 The [employer]. [address] [place] [address]. All rights reserved. This information is not intended as a substitute for professional medical care. Always follow your healthcare professional's instructions.          Peritonsillar Abscess  You (or your child) has an abscess (collection of pus) around the tonsils. This abscess can cause severe sore throat, pain with swallowing, fever, drooling, and trouble opening the mouth.  The abscess is treated with antibiotics. These may be started using an IV (intravenous line), then continued by mouth. In most cases, the abscess will also be drained.  Home care    All of the antibiotics should be taken as prescribed until they are gone. This is true even if symptoms start to get better. This is very important to ensure that the infection goes away. This infection can lead to serious complications.    Pain medicines should be taken as directed.    To help ease pain, children older than 6 years and adults can gargle with warm salt water four times a day for the first 2 days. Dissolve 1/2 teaspoon of  salt in 1 glass of hot water. Gargle with the solution then spit it out. (Ensure that children do not swallow the salt water.)    Cool liquids and soft foods may make eating easier for the first few days.  Follow-up care  Follow up with a healthcare provider or as advised within 1-2 days.   When to seek medical advice  Call the healthcare provider right away if any of the following occur:    Fever of 100.4 F (38 C) or higher after 3 days of treatment    Symptoms that get worse    Symptoms that go away and come back    Trouble swallowing liquids or taking medicine    Trouble breathing    Neck stiffness    Bleeding    Rash    Swelling or bumps in the neck  Date Last Reviewed: 10/1/2017    4495-5301 [employer] [address] [place] [address], [place] [address]. All rights reserved. This information is not intended as a substitute for professional medical care. Always follow your healthcare professional's instructions.

## 2018-10-16 NOTE — ED AVS SNAPSHOT
Emergency Department    [address] [facility] [address]    Phone:  [phone]    Fax:  [phone]                                       Esperanza Vidales   MRN: [id number]    Department:   Emergency Department   Date of Visit:  10/16/2018           After Visit Summary Signature Page     I have received my discharge instructions, and my questions have been answered. I have discussed any challenges I see with this plan with the nurse or doctor.    ..........................................................................................................................................  Patient/Patient Representative Signature      ..........................................................................................................................................  Patient Representative Print Name and Relationship to Patient    ..................................................               ................................................  Date                                   Time    ..........................................................................................................................................  Reviewed by Signature/Title    ...................................................              ..............................................  Date                                               Time          22EPIC Rev 08/18

## 2018-11-07 ENCOUNTER — TELEPHONE (OUTPATIENT)
Dept: OTOLARYNGOLOGY | Facility: CLINIC | Age: 23
End: 2018-11-07

## 2018-11-07 ENCOUNTER — OFFICE VISIT (OUTPATIENT)
Dept: URGENT CARE | Facility: URGENT CARE | Age: 23
End: 2018-11-07
Payer: COMMERCIAL

## 2018-11-07 VITALS
RESPIRATION RATE: 16 BRPM | TEMPERATURE: 98.3 F | SYSTOLIC BLOOD PRESSURE: 118 MMHG | OXYGEN SATURATION: 99 % | DIASTOLIC BLOOD PRESSURE: 84 MMHG | HEART RATE: 77 BPM

## 2018-11-07 DIAGNOSIS — R07.0 THROAT PAIN: ICD-10-CM

## 2018-11-07 DIAGNOSIS — J06.9 VIRAL URI: Primary | ICD-10-CM

## 2018-11-07 LAB
BASOPHILS # BLD AUTO: 0 10E9/L (ref 0–0.2)
BASOPHILS NFR BLD AUTO: 0.1 %
DEPRECATED S PYO AG THROAT QL EIA: NORMAL
DIFFERENTIAL METHOD BLD: NORMAL
EOSINOPHIL # BLD AUTO: 0.1 10E9/L (ref 0–0.7)
EOSINOPHIL NFR BLD AUTO: 0.9 %
ERYTHROCYTE [DISTWIDTH] IN BLOOD BY AUTOMATED COUNT: 13.1 % (ref 10–15)
HCT VFR BLD AUTO: 39.8 % (ref 35–47)
HETEROPH AB SER QL: NEGATIVE
HGB BLD-MCNC: 13.5 G/DL (ref 11.7–15.7)
LYMPHOCYTES # BLD AUTO: 1.5 10E9/L (ref 0.8–5.3)
LYMPHOCYTES NFR BLD AUTO: 15 %
MCH RBC QN AUTO: 30.3 PG (ref 26.5–33)
MCHC RBC AUTO-ENTMCNC: 33.9 G/DL (ref 31.5–36.5)
MCV RBC AUTO: 89 FL (ref 78–100)
MONOCYTES # BLD AUTO: 1 10E9/L (ref 0–1.3)
MONOCYTES NFR BLD AUTO: 9.9 %
NEUTROPHILS # BLD AUTO: 7.3 10E9/L (ref 1.6–8.3)
NEUTROPHILS NFR BLD AUTO: 74.1 %
PLATELET # BLD AUTO: 280 10E9/L (ref 150–450)
RBC # BLD AUTO: 4.45 10E12/L (ref 3.8–5.2)
SPECIMEN SOURCE: NORMAL
WBC # BLD AUTO: 9.9 10E9/L (ref 4–11)

## 2018-11-07 PROCEDURE — 86308 HETEROPHILE ANTIBODY SCREEN: CPT | Performed by: PHYSICIAN ASSISTANT

## 2018-11-07 PROCEDURE — 85025 COMPLETE CBC W/AUTO DIFF WBC: CPT | Performed by: PHYSICIAN ASSISTANT

## 2018-11-07 PROCEDURE — 99213 OFFICE O/P EST LOW 20 MIN: CPT | Performed by: PHYSICIAN ASSISTANT

## 2018-11-07 PROCEDURE — 36415 COLL VENOUS BLD VENIPUNCTURE: CPT | Performed by: PHYSICIAN ASSISTANT

## 2018-11-07 PROCEDURE — 87081 CULTURE SCREEN ONLY: CPT | Performed by: PHYSICIAN ASSISTANT

## 2018-11-07 PROCEDURE — 87880 STREP A ASSAY W/OPTIC: CPT | Performed by: FAMILY MEDICINE

## 2018-11-07 RX ORDER — IBUPROFEN 800 MG/1
800 TABLET, FILM COATED ORAL EVERY 8 HOURS PRN
Qty: 30 TABLET | Refills: 1 | Status: SHIPPED | OUTPATIENT
Start: 2018-11-07 | End: 2018-12-28

## 2018-11-07 RX ORDER — FLUTICASONE PROPIONATE 50 MCG
2 SPRAY, SUSPENSION (ML) NASAL DAILY
Qty: 1 BOTTLE | Refills: 0 | Status: SHIPPED | OUTPATIENT
Start: 2018-11-07 | End: 2018-12-12

## 2018-11-07 NOTE — PROGRESS NOTES
SUBJECTIVE:   Esperanza Vidales is a 23 year old female presenting with a chief complaint of   1) sore throat  2) sinus congestion   3) headaches  4) cough, dry    Onset of symptoms was 1 week(s) ago.  Course of illness is worsening.    Severity mild to moderate  Current and Associated symptoms: as above.       Treatment measures tried include none.  Predisposing factors include peritonsillar abscess recently.  Boyfriend has Alger    No past medical history on file.     Peritonsillar abscess    There is no problem list on file for this patient.    Social History   Substance Use Topics     Smoking status: Never Smoker     Smokeless tobacco: Never Used     Alcohol use Yes      Comment: Occasional       ROS:  CONSTITUTIONAL:NEGATIVE for fever, chills, change in weight  INTEGUMENTARY/SKIN: NEGATIVE for worrisome rashes, moles or lesions  EYES: NEGATIVE for vision changes or irritation  ENT/MOUTH: as per HPI  RESP:NEGATIVE for significant cough or SOB  CV: NEGATIVE for chest pain, palpitations or peripheral edema  GI: NEGATIVE for nausea, abdominal pain, heartburn, or change in bowel habits  MUSCULOSKELETAL: NEGATIVE for significant arthralgias or myalgia  NEURO: NEGATIVE for weakness, dizziness or paresthesias  Review of systems negative except as stated above.    OBJECTIVE  :/84  Pulse 77  Temp 98.3  F (36.8  C) (Oral)  Resp 16  SpO2 99%  GENERAL APPEARANCE: healthy, alert and no distress  EYES: EOMI,  PERRL, conjunctiva clear  HENT: ear canals and TM's normal.  Nose and mouth without ulcers, erythema or lesions  HENT: left tonsil is edematous, without erythema or exudate.    NECK: supple, nontender, no lymphadenopathy  RESP: lungs clear to auscultation - no rales, rhonchi or wheezes  CV: regular rates and rhythm, normal S1 S2, no murmur noted  ABDOMEN:  soft, nontender, no HSM or masses and bowel sounds normal  NEURO: Normal strength and tone, sensory exam grossly normal,  normal speech and  mentation  SKIN: no suspicious lesions or rashes    (J06.9) Viral URI  (primary encounter diagnosis)  Comment:   Plan: fluticasone (FLONASE) 50 MCG/ACT spray,         ibuprofen (ADVIL/MOTRIN) 800 MG tablet            (R07.0) Throat pain  Comment:   Plan: Strep, Rapid Screen, Beta strep group A         culture, Mononucleosis screen, CBC with         platelets and differential            Follow up with primary clinic and or ENT to evaluate chronic left tonsil swelling.      Patient expresses understanding and agreement with the assessment and plan as above.

## 2018-11-07 NOTE — TELEPHONE ENCOUNTER
Health Call Center    Phone Message    May a detailed message be left on voicemail: yes    Reason for Call: Other: Patient's mother called in [facility] regarding the patient's tonsils. She stated the patient was seen at a  clinic in [place] on 11/7 and she was told her tonsils are very swollen and they know they need to be removed. The first appointment  has is not until 12/13 . The patient is in a lot of pain and is wanting to end this semester at school early due to this. She is wondering if they can just schedule the surgery or what  recommends she can do for this. Please follow up with the patient or her mother. Thank you.    Action Taken: Message routed to:  [facility] ([facility] ENT

## 2018-11-07 NOTE — PATIENT INSTRUCTIONS
(J06.9) Viral URI  (primary encounter diagnosis)  Comment:   Plan: fluticasone (FLONASE) 50 MCG/ACT spray,         ibuprofen (ADVIL/MOTRIN) 800 MG tablet            (R07.0) Throat pain  Comment:   Plan: Strep, Rapid Screen, Beta strep group A         culture, Mononucleosis screen, CBC with         platelets and differential            Follow up with primary clinic and or ENT to evaluate chronic left tonsil swelling.

## 2018-11-07 NOTE — MR AVS SNAPSHOT
"              After Visit Summary   11/7/2018    Esperanza Vidales    MRN: [id number]           Patient Information     Date Of Birth          1995        Visit Information        Provider Department      11/7/2018 1:35 PM Madelyn Goel PA-C [facility]        Today's Diagnoses     Viral URI    -  1    Throat pain          Care Instructions    (J06.9) Viral URI  (primary encounter diagnosis)  Comment:   Plan: fluticasone (FLONASE) 50 MCG/ACT spray,         ibuprofen (ADVIL/MOTRIN) 800 MG tablet            (R07.0) Throat pain  Comment:   Plan: Strep, Rapid Screen, Beta strep group A         culture, Mononucleosis screen, CBC with         platelets and differential            Follow up with primary clinic and or ENT to evaluate chronic left tonsil swelling.                Follow-ups after your visit        Who to contact     If you have questions or need follow up information about today's clinic visit or your schedule please contact [facility] directly at [phone].  Normal or non-critical lab and imaging results will be communicated to you by [employer], letter or phone within 4 business days after the clinic has received the results. If you do not hear from us within 7 days, please contact the clinic through [employer] or phone. If you have a critical or abnormal lab result, we will notify you by phone as soon as possible.  Submit refill requests through Frontback or call your pharmacy and they will forward the refill request to us. Please allow 3 business days for your refill to be completed.          Additional Information About Your Visit        [employer] Information     Frontback lets you send messages to your doctor, view your test results, renew your prescriptions, schedule appointments and more. To sign up, go to [email or web address] . Click on \"Log in\" on the left side of the screen, which will take you to the Welcome page. Then " "click on \"Sign up Now\" on the right side of the page.     You will be asked to enter the access code listed below, as well as some personal information. Please follow the directions to create your username and password.     Your access code is: [email or web address]  Expires: 2018  5:30 AM     Your access code will  in 90 days. If you need help or a new code, please call your [place] clinic or [phone].        Care EveryWhere ID     This is your Care EveryWhere ID. This could be used by other organizations to access your [place] medical records  [id number]        Your Vitals Were     Pulse Temperature Respirations Pulse Oximetry          77 98.3  F (36.8  C) (Oral) 16 99%         Blood Pressure from Last 3 Encounters:   18 118/84   10/16/18 124/72   10/15/18 126/67    Weight from Last 3 Encounters:   10/16/18 142 lb (64.4 kg)   10/15/18 142 lb (64.4 kg)   17 133 lb (60.3 kg)              We Performed the Following     Beta strep group A culture     CBC with platelets and differential     Mononucleosis screen     Strep, Rapid Screen          Today's Medication Changes          These changes are accurate as of 18  3:15 PM.  If you have any questions, ask your nurse or doctor.               Start taking these medicines.        Dose/Directions    fluticasone 50 MCG/ACT spray   Commonly known as:  FLONASE   Used for:  Viral URI   Started by:  Madelyn Goel PA-C        Dose:  2 spray   Spray 2 sprays into both nostrils daily   Quantity:  1 Bottle   Refills:  0       ibuprofen 800 MG tablet   Commonly known as:  ADVIL/MOTRIN   Used for:  Viral URI   Started by:  Madelyn Goel PA-C        Dose:  800 mg   Take 1 tablet (800 mg) by mouth every 8 hours as needed for moderate pain   Quantity:  30 tablet   Refills:  1            Where to get your medicines      These medications were sent to [facility] - [place], [place] - [address] [place] [address], " [facility] [id number]     Phone:  [phone]     fluticasone 50 MCG/ACT spray    ibuprofen 800 MG tablet                Primary Care Provider Fax #    Physician No Ref-Primary [phone]       No address on file        Equal Access to Services     JUANY WATTS : Ayana mirian adame adve Kendall, waaxda luqadaha, qaybta kaalmada adeabrahamyada, waldo rizo laLolatan chang. So [facility] [phone].    ATENCIÓN: Si habla español, tiene a mclean disposición servicios gratuitos de asistencia lingüística. Llame al [phone].    We comply with applicable federal civil rights laws and [place] laws. We do not discriminate on the basis of race, color, national origin, age, disability, sex, sexual orientation, or gender identity.            Thank you!     Thank you for choosing [facility]  for your care. Our goal is always to provide you with excellent care. Hearing back from our patients is one way we can continue to improve our services. Please take a few minutes to complete the written survey that you may receive in the mail after your visit with us. Thank you!             Your Updated Medication List - Protect others around you: Learn how to safely use, store and throw away your medicines at www.disposemymeds.org.          This list is accurate as of 11/7/18  3:15 PM.  Always use your most recent med list.                   Brand Name Dispense Instructions for use Diagnosis    fluticasone 50 MCG/ACT spray    FLONASE    1 Bottle    Spray 2 sprays into both nostrils daily    Viral URI       ibuprofen 800 MG tablet    ADVIL/MOTRIN    30 tablet    Take 1 tablet (800 mg) by mouth every 8 hours as needed for moderate pain    Viral URI       NEXPLANON 68 MG Impl   Generic drug:  etonogestrel      1 each by Subdermal route once

## 2018-11-08 LAB
BACTERIA SPEC CULT: NORMAL
SPECIMEN SOURCE: NORMAL

## 2018-11-08 NOTE — TELEPHONE ENCOUNTER
Reason for call: Ester (julia, Beatriz) would like to discus options for getting Esperanza in to have tonsils removed asap.  She said it is affecting her academics.  Please call back as soon as possible to help.  2nd request     Phone number to reach patient:  Other phone number:  [phone]    Best Time:  Asap    Can we leave a detailed message on this number?  YES

## 2018-11-08 NOTE — TELEPHONE ENCOUNTER
[facility]    Phone Message    May a detailed message be left on voicemail: yes    Reason for Call: Other: Mom called back in.  Dr Jackman's first new patient appt isnt until next Thursday.  Mom would like patient to be seen sooner .  Please call mom back to discuss symptoms.       Action Taken: Message routed to:  [facility] ([facility] luis

## 2018-11-08 NOTE — TELEPHONE ENCOUNTER
Call made back to mom, consent to communicate on file.     tonsillar abscesses. Seen UC yesterday.     Call made to patient, who is reporting trouble swallowing, breathing.     ENT Symptoms             Symptoms: cc Present Absent Comment   Fever/Chills  x     Fatigue  x     Muscle Aches   x    Eye Irritation   x    Sneezing   x    Nasal Harsha/Drg   x    Sinus Pressure/Pain   x    Loss of smell   x    Dental pain   x    Sore Throat  x     Swollen Glands  x     Ear Pain/Fullness   x    Cough  x     Wheeze   x    Chest Pain   x    Shortness of breath   x    Rash   x    Other           DANISH Staples RN  Triage RN  [facility]    Patient's mother requested we call the ENT to see if the patient can get in sooner.     Call made to the patient to triage her symptoms and make sure she didn't need to go to the ED.     She does not. Not cutting off her airway at this point, no trouble swallowing.     Call made to the ENT, the earliest appointment available is November 16th at 4:30.    Was told to go to ER for trouble swallowing, breathing or airway obstruction.

## 2018-11-15 NOTE — TELEPHONE ENCOUNTER
Phone Call:     Contact Name [facility]   Outcome LEFT VM TO GET IMAGE PUSHED TO PACS

## 2018-11-15 NOTE — TELEPHONE ENCOUNTER
FUTURE VISIT INFORMATION      FUTURE VISIT INFORMATION:    Date: 11/16/2018    Time: 4:30    [place] [facility] [place]  REFERRAL INFORMATION:    Referring provider:  MEGHAN PULIDO    Referring providers clinic:  [facility]    Reason for visit/diagnosis  Tonsillitis    RECORDS REQUESTED FROM:       Clinic name Comments Records Status Imaging Status   [facility] OFFICE VISIT: 07/02/2018,07/12/2018,07/02/2018  IMAGE: CT NECK 07/02/2018 EXTERNAL YES

## 2018-11-16 ENCOUNTER — PRE VISIT (OUTPATIENT)
Dept: ORTHOPEDICS | Facility: CLINIC | Age: 23
End: 2018-11-16

## 2018-11-16 ENCOUNTER — OFFICE VISIT (OUTPATIENT)
Dept: ORTHOPEDICS | Facility: CLINIC | Age: 23
End: 2018-11-16
Payer: COMMERCIAL

## 2018-11-16 ENCOUNTER — PRE VISIT (OUTPATIENT)
Dept: OTOLARYNGOLOGY | Facility: CLINIC | Age: 23
End: 2018-11-16

## 2018-11-16 ENCOUNTER — OFFICE VISIT (OUTPATIENT)
Dept: OTOLARYNGOLOGY | Facility: CLINIC | Age: 23
End: 2018-11-16
Payer: COMMERCIAL

## 2018-11-16 VITALS — BODY MASS INDEX: 25.69 KG/M2 | HEIGHT: 63 IN | WEIGHT: 145 LBS

## 2018-11-16 VITALS
HEIGHT: 62 IN | WEIGHT: 142 LBS | HEART RATE: 101 BPM | BODY MASS INDEX: 26.13 KG/M2 | DIASTOLIC BLOOD PRESSURE: 77 MMHG | SYSTOLIC BLOOD PRESSURE: 126 MMHG

## 2018-11-16 DIAGNOSIS — J35.1 HYPERTROPHY OF TONSILS: ICD-10-CM

## 2018-11-16 DIAGNOSIS — M54.10 RADICULAR PAIN OF LEFT LOWER EXTREMITY: Primary | ICD-10-CM

## 2018-11-16 DIAGNOSIS — J35.01 CHRONIC TONSILLITIS: Primary | ICD-10-CM

## 2018-11-16 RX ORDER — METHYLPREDNISOLONE 4 MG
TABLET, DOSE PACK ORAL
Qty: 21 TABLET | Refills: 0 | Status: SHIPPED | OUTPATIENT
Start: 2018-11-16 | End: 2018-12-12

## 2018-11-16 ASSESSMENT — PAIN SCALES - GENERAL: PAINLEVEL: MODERATE PAIN (4)

## 2018-11-16 NOTE — PROGRESS NOTES
SPORTS & ORTHOPEDIC WALK-IN VISIT 11/16/2018    Primary Care Physician: Dr. Sanches to sciatica. Had problems with it in the past, calmed down for a long time but then got sick, stopped exercising, and having pain again now. Numbness/tingling down to knee. Had been seen for this in the past, got PT at [facility]. Hasn't been able to exercise due to throat abscess and other viral infections.     Reason for visit:     What part of your body is injured / painful?  left hip and left low back    What caused the injury /pain? Unsure    How long ago did your injury occur or pain begin? problem is longstanding, this episode began 2day(s)ago    What are your most bothersome symptoms? Pain, Numbness and Tingling    How would you characterize your symptom?  Shooting with certain movement, tingling and numb    What makes your symptoms better? Heat and Tylenol    What makes your symptoms worse? Walking for long times and certain Movement, laying down for a long time and trying to get up    Have you been previously seen for this problem? Yes, [facility]    Medical History:    Any recent changes to your medical history? No    Any new medication prescribed since last visit? No    Have you had surgery on this body part before? No    Social History:    Occupation: student    Handedness: Right    Exercise: 4-5 days/week    Review of Systems:    Do you have fever, chills, weight loss? No     Do you have any vision problems? No    Do you have any chest pain or edema? No    Do you have any shortness of breath or wheezing?  No    Do you have stomach problems? Yes, related to meds    Do you have any numbness or focal weakness? No    Do you have diabetes? No    Do you have problems with bleeding or clotting? No    Do you have an rashes or other skin lesions? No

## 2018-11-16 NOTE — PATIENT INSTRUCTIONS
Low back pain     What is low back pain?    Low back pain is pain and stiffness in the lower back.  It is one of the most common reasons people miss work.      How does it occur?    Low back pain is usually caused when a ligament or muscle holding a vertebra in its proper position is strained.  Vertebrae are bones that make up the spinal column through which the spinal cord passes.  When these muscles or ligaments become weak, the spine loses its ability, resulting in pain.  Because nerves reach all parts of the body from the spinal cord, back problems can lead to pain or weakness in almost any part of the body.      Low back pain can occur if your job involves lifting and carrying heavy objects, or if you spend a lot of time sitting or standing in one position or bending over.  It can be caused by a fall or by unusually strenuous exercise.  It can be brought on by the tension and stress that causes headaches in some people.  It can even be brought on by violent sneezing or coughing.      People who are overweight may have low back pain because of the added stress on their back.      Back pain may occur when the muscles, joints, bones and connective tissues in the back become inflamed as a result of an infection or an immune system problem.  Arthritic disorders as well as some congenital and degenerative conditions may cause back pain.      Back pain accompanied by loss of bladder or bowel control, difficulty moving your legs, or numbness or tingling in your arms or legs may indicate an injury to your spine and nerves, which requires immediate medical treatment.      What are the symptoms?    Symptoms include:      Pain in the back or legs    Stiffness and limited motion    The pain may be continuous or may occur in certain positions.  It may be aggravated by coughing, sneezing, bending, twisting, or straining during a bowel movement. The pain may occur in only one spot or may spread to other areas, most commonly  down the buttocks and into the back of the thigh.     A low back strain typically does not produce pain past the knee into the calf or foot.  Tingling and numbness in the calf or foot may indicate a herniated disk or pinched nerve.      How is it diagnosed?    Your healthcare provider will review your medical history and examine you.  He or she may order X-rays.  In certain situations, a myelogram, CT scan, or MRI may be ordered.    How is it treated?    The early stages of back pain with muscle spasms should be treated with ice packs for 20-30 minutes every 4 to 6 hours for the first 2 to 3 days. You m ay lie on a frozen gel pack, crushed ice, or a bag of frozen peas.    The following are always to treat low back pain:      After the initial injury, applying heat from a heating pad or hot water bottle    Resting in bed on a firm mattress.  Often it helps to lie on your back with your knees raised.  However, isome people prefer to lie on their side with their knees bent.      Taking aspirin, ibuprofen, or other anti-inflammatory medications; muscle relaxants; or other pain medications if recommended by your healthcare provider (adults aged 65 years and older should not take non-steroidal anti-inflammatory medicine for more than 7 days without their healthcare provider's approval).    Having your back massaged by a trained person    Having traction, if recommended by your provider    Wearing a belt or corset to support your back    Talking with a counselor, if your back pain is related to tension caused by emotional problems.    Beginning a program of physical therapy, or exercising on your own.  Begin a regular exercise program to gently stretch and strengthen your muscles as soon as you can.  Your healthcare provider or physical therapist can recommend exercises that will not only help you feel better but will strengthen your muscles and help avoid back trouble later.      When the pain subsides, ask your  healthcare provider about starting an exercise program such as the following:       Exercise moderately every day, using stretching and warm-up exercises suggested by your provider or physical therapist.    Exercise vigorously for about 30 minutes two or three times a week by walking, swimming, using a stationary bicycle, or doing low-impact aerobics.    Participating regularly in an exercise program will not only help your back, it will also help keep you healthier overall.     How long will the effects last?      The effects of back pain last as long as the cause exists or until your body recovers from the strain, usually a day or two but sometimes weeks.     How can I take care of myself?    In addition to the treatment described above, keep in mind these suggestions:      Use an electric heating pad on a low setting (or a hot water bottle wrapped in a towel to avoid burning yourself) for 20 to 30 minutes.  Don't let the heating pad get too hot, and don't fall asleep with it.  You could get a burn.     Try putting an ice pack wrapped in a towel on your back for 20 minutes, one to four times a day.  Set an alarm to avoid frostbite from using the ice pack too long.    Put a pillow under your knees when you are lying down.    Sleep without a pillow under your head.    Lose weight if you are overweight.    Practice good posture.  Stand with your head up, shoulders straight and chest forward, weight balanced evenly on both feet, and pelvis tucked in.     Pain is the best way to  the pace you should set in increasing your activity and exercise.  Minor discomfort, stiffness, soreness, and mild aches need not interfere with activity.  However, limit your activity temporarily if:      your symptoms return    the pain increases when you are more active    the pain increases within 24 hours after a new or higher level of activity    When can I return to my sport or activity?    The goal of rehabilitation is to return  you to your sport or activity as soon as safely possible. If you return too soon you may worsen your injury, which could lead to permanent damage.  Everyone recovers from injury at different rate.  Return to your sport will be determined by how soon your back recovers, not by how many days or weeks it has been since your injury occurred.  In general, the longer you have symptoms before you start treatment, the longer it will take to get better.      It is important that you have fully recovered from your low back pain before you return to your sport or any strenuous activity.  You must be able to have the same range of motion that you had before your injury.  You must be able to run, jump and twist without pain.      What can I do to help prevent low back pain?    You can reduce the strain on your back by doing the following:      Don't push with your arms when you move a heavy object.  Turn around and push backwards so the strain is taken by your legs.     Whenever you sit, sit in a straight-backed chair and hold your spine against the back of the chair.     Bend your knees and hips and keep your back straight when you lift a heavy object.     Avoid lifting heavy objects higher than your waist    Hold packages you carry close to your body, with your arms bent.    Use a footrest for one foot when you stand or sit in one spot for a long time.  This keeps your back straight.    Bend your knees when you bend over.    Sit close to the pedals when you drive and use your seat belt and a hard backrest or pillow.     Lie on your side with your knees bent when you sleep or rest.  It may help to put a pillow between your knees.    Put a pillow under your knees when you sleep on your back.    Raise the foot of the bed 8 inches to discourage sleeping on your stomach unless you have other problems that require that you keep your head elevated.      To rest your back, hold each of these positions for 5 minutes or longer:  Lie on  your back, bend your knees, and put pillows under your knees.    Lie on your back, put a pillow under your neck, bend your knees to a 90-degree angle, and put your lower legs and feet on a chair.    Lie on your back, bend your knees, and bring one knee up to your chest and hold it there.  Repeat with the other knee, then bring both knees to your chest.  When holding your knee to your chest, grab your thigh rather than your lower leg to avoid over flexing your knee.           Exercises that stretch and strengthen the muscles of your abdomen and spine can help prevent back problems. Strong back and abdominal muscles help you keep good posture, with your spine in its correct position.    If your muscles are tight, take a warm shower or bath before doing the exercises. Exercise on a rug or mat. Wear loose clothing. Don t wear shoes. Stop doing any exercise that causes pain until you have talked with your healthcare provider.    Ask your provider or physical therapist to help you develop an exercise program. Ask your provider how many times a week you need to do the exercises. Remember to start slowly.    Excerpts from: The Sports Medicine Patient Advisor 3rd edition. Copyright 2010 [employer].     Low Back Pain Exercises    EXERCISES  These exercises are intended only as suggestions. Be sure to check with your provider before starting the exercises.    Standing hamstring stretch: Put the heel of one leg on a stool about 15 inches high. Keep your leg straight. Lean forward, bending at the hips until you feel a mild stretch in the back of your thigh. Make sure you do not roll your shoulders or bend at the waist when doing this. You want to stretch your leg, not your lower back. Hold the stretch for 15 to 30 seconds. Repeat with each leg 3 times.    Cat and camel: Get down on your hands and knees. Let your stomach sag, allowing your back to curve downward. Hold this position for 5 seconds. Then arch your back and  hold for 5 seconds. Do 2 sets of 15.    Quadruped arm and leg raise: Get down on your hands and knees. Pull in your belly button and tighten your abdominal muscles to stiffen your spine. While keeping your abdominals tight, raise one arm and the opposite leg away from you. Hold this position for 5 seconds. Lower your arm and leg slowly and change sides. Do this 10 times on each side.    Pelvic tilt: Lie on your back with your knees bent and your feet flat on the floor. Pull your belly button in towards your spine and push your lower back into the floor, flattening your back. Hold this position for 15 seconds, then relax. Repeat 5 to 10 times.  Partial curl: Lie on your back with your knees bent and your feet flat on the floor. Draw in your abdomen and tighten your stomach muscles. With your hands stretched out in front of you, curl your upper body forward until your shoulders clear the floor. Hold this position for 3 seconds. Don't hold your breath. It helps to breathe out as you lift your shoulders. Relax back to the floor. Repeat 10 times. Build to 2 sets of 15. To challenge yourself, clasp your hands behind your head and keep your elbows out to your sides.    Gluteal stretch: Lie on your back with both knees bent. Rest your right ankle over the knee of your left leg. Grasp the thigh of the left leg and pull toward your chest. You will feel a stretch along the buttocks and possibly along the outside of your hip. Hold the stretch for 15 to 30 seconds. Then repeat the exercise with your left ankle over your right knee. Do the exercise 3 times with each leg.    Extension exercise  Lie face down on the floor for 5 minutes. If this hurts too much, lie face down with a pillow under your stomach. This should relieve your leg or back pain. When you can lie on your stomach for 5 minutes without a pillow, you can continue with Part B of this exercise.    After lying on your stomach for 5 minutes, prop yourself up on your  elbows for another 5 minutes. If you can do this without having more leg or buttock pain, you can start doing part C of this exercise.    Lie on your stomach with your hands under your shoulders. Then press down on your hands and extend your elbows while keeping your hips flat on the floor. Hold for 1 second and lower yourself to the floor. Do 3 to 5 sets of 10 repetitions. Rest for 1 minute between sets. You should have no pain in your legs when you do this, but it is normal to feel some pain in your lower back.    Do this exercise several times a day.    Side plank: Lie on your side with your legs, hips, and shoulders in a straight line. Prop yourself up onto your forearm with your elbow directly under your shoulder. Lift your hips off the floor and balance on your forearm and the outside of your foot. Try to hold this position for 15 seconds and then slowly lower your hip to the ground. Switch sides and repeat. Work up to holding for 1 minute. This exercise can be made easier by starting with your knees and hips flexed toward your chest.    EXERCISES TO AVOID     It s best to avoid the following exercises because they strain the lower back:    Exercises in which you lie on your back and raise and lower both legs together    Full sit-ups or sit-ups with straight legs    Hip twists    SPORTS AND OTHER ACTIVITIES    In addition to strengthening your back muscles, it s helpful to keep your entire body in shape. Good activities for people with back problems include:      Walking    Bicycling    Swimming    Cross-country skiing    Yoga    Ja Chi    Pilates    Some sports can hurt your back because of rough contact, twisting, sudden impact, or direct stress on your back. Sports that may be dangerous to your back include:      Football    Soccer    Volleyball    Handball    Golf    Weight lifting    Trampoline    Tobogganing    Sledding    Snowmobiling    Snowboarding    Ice hockey            Try lifting both knees to  chest as well. This is a great stretch first thing in the morning, while still in bed.

## 2018-11-16 NOTE — PROGRESS NOTES
[facility]  Orthopedics  Rajat Alvarenga,   2018     Name: Esperanza Vidales  MRN: [id number]  Age: 23 year old  : 1995  Referring provider: Referred Self     Chief Complaint: Pain of the Lower Back and Pain of the Left Hip     Date of Injury: 2018    History of Present Illness:   Esperanza Vidales is a 23 year old, female who presents today for evaluation of left leg and buttocks pain. The patient reports her pain in her left leg and buttock that onset on 2018 with no specific injury. She was diagnosed with left sided sciatica last year that resolved, but she states she got sick, stopped exercising in the last 4 weeks due to a pharyngeal abscess and the pain has returned. She localizes her pain in the posterolateral aspect of the hip/buttock that radiates down to her knee. She describes the pain as a sharp stabbing pain that shoots down her leg. Rarely past her knee, farthest to mid calf.  She experiences numbness and tingling down her leg. She states that her pain flares up and no specific movements exacerbate or alleviate her symptoms. She voices no further concerns at this time.     Review of Systems:   A 10-point review of systems was obtained and is negative except for as noted in the HPI.     Medications:   Current Outpatient Prescriptions:      etonogestrel (NEXPLANON) 68 MG IMPL, 1 each by Subdermal route once, Disp: , Rfl:      fluticasone (FLONASE) 50 MCG/ACT spray, Spray 2 sprays into both nostrils daily, Disp: 1 Bottle, Rfl: 0     ibuprofen (ADVIL/MOTRIN) 800 MG tablet, Take 1 tablet (800 mg) by mouth every 8 hours as needed for moderate pain, Disp: 30 tablet, Rfl: 1    Allergies:  Allergies   Allergen Reactions     Latex Rash     Past Medical History:  No past medical history on file.    Past Surgical History:  No past surgical history on file.     Social History:    Social History     Marital status: Single     Spouse name: N/A     Number of children: N/A     Years  "of education: N/A     Social History Main Topics     Smoking status: Never Smoker     Smokeless tobacco: Never Used     Alcohol use Yes      Comment: Occasional     Drug use: No     Sexual activity: Not Asked     Other Topics Concern     None     Social History Narrative     Family History:  No family history on file.    Physical Examination:  Blood pressure 126/77, pulse 101, height 1.575 m (5' 2\"), weight 64.4 kg (142 lb), not currently breastfeeding.  General  - normal appearance, in no obvious distress  CV  - normal peripheral perfusion  Pulm  - normal respiratory pattern, non-labored  Musculoskeletal - lumbar spine  - stance: slow to rise and sit  - inspection: normal bone and joint alignment, no obvious scoliosis  - palpation: No lumbar paraspinal tenderness. Central left gluteal tenderness to palpation.   - ROM: Pain with extension. Flexion is normal. Pain with left side bending and extension.    - strength: lower extremities 5/5 in all planes  - special tests:  (+) straight leg raise bilaterally, left side  (+) slump test, left side  Neuro  - patellar and Achilles DTRs 2+ bilaterally, no sensory or motor deficit, grossly normal coordination, normal muscle tone  Skin  - no ecchymosis, erythema, warmth, or induration, no obvious rash  Psych  - interactive, appropriate, normal mood and affect    IMAGING: Reviewed in PACS from last year.  No evidence of disc narrowing or degenerative changes of spine. Normal read.    Assessment:   23 year old, female with a history of sciatic back pain with acute episode of sciatic pain effecting left lower extremity. Given severity today and recurrent episode, MRI warranted to further investigate pathology.  Suspected disc bulge with impingement at S1.     Diagnosis: Acute left gluteal pain with radiculopathy.     Plan:   - Scheduled an MRI of the lumbar spine   - Prescribed a medrol dose selina  - Recommended heat therapy and conservative management  - Continue at home exercises " obtained last year   - Follow up with me after the MRI to discuss the results and further treatment options.     I, Rajat Alvarenga DO, have reviewed the above note and agree with the scribe's notation as written.    Scribe Disclosure:   I, Eulogio Quintanilla, am serving as a scribe to document services personally performed by Rajat Alvarenga DO at this visit, based upon the provider's statements to me. All documentation has been reviewed by the aforementioned provider prior to being entered into the official medical record.

## 2018-11-16 NOTE — LETTER
11/16/2018       RE: Esperanza Vidales  [address] [facility] [address]     Dear Colleague,    Thank you for referring your patient, Esperanza Vidales, to the [facility] SPORTS AND ORTHOPAEDIC WALK IN CLINIC at [facility]. Please see a copy of my visit note below.          SPORTS & ORTHOPEDIC WALK-IN VISIT 11/16/2018    Primary Care Physician: Dr. Sanches to sciatica. Had problems with it in the past, calmed down for a long time but then got sick, stopped exercising, and having pain again now. Numbness/tingling down to knee. Had been seen for this in the past, got PT at [facility]. Hasn't been able to exercise due to throat abscess and other viral infections.     Reason for visit:     What part of your body is injured / painful?  left hip and left low back    What caused the injury /pain? Unsure    How long ago did your injury occur or pain begin? problem is longstanding, this episode began 2day(s)ago    What are your most bothersome symptoms? Pain, Numbness and Tingling    How would you characterize your symptom?  Shooting with certain movement, tingling and numb    What makes your symptoms better? Heat and Tylenol    What makes your symptoms worse? Walking for long times and certain Movement, laying down for a long time and trying to get up    Have you been previously seen for this problem? Yes, [facility]    Medical History:    Any recent changes to your medical history? No    Any new medication prescribed since last visit? No    Have you had surgery on this body part before? No    Social History:    Occupation: student    Handedness: Right    Exercise: 4-5 days/week    Review of Systems:    Do you have fever, chills, weight loss? No     Do you have any vision problems? No    Do you have any chest pain or edema? No    Do you have any shortness of breath or wheezing?  No    Do you have stomach problems? Yes, related to meds    Do you have any numbness or focal  weakness? No    Do you have diabetes? No    Do you have problems with bleeding or clotting? No    Do you have an rashes or other skin lesions? No           [facility]  Orthopedics  Rajat Alvarenga DO  2018     Name: Esperanza Vidales  MRN: [id number]  Age: 23 year old  : 1995  Referring provider: Referred Self     Chief Complaint: Pain of the Lower Back and Pain of the Left Hip     Date of Injury: 2018    History of Present Illness:   Esperanza Vidales is a 23 year old, female who presents today for evaluation of left leg and buttocks pain. The patient reports her pain in her left leg and buttock that onset on 2018 with no specific injury. She was diagnosed with left sided sciatica last year that resolved, but she states she got sick, stopped exercising in the last 4 weeks due to a pharyngeal abscess and the pain has returned. She localizes her pain in the posterolateral aspect of the hip/buttock that radiates down to her knee. She describes the pain as a sharp stabbing pain that shoots down her leg. Rarely past her knee, farthest to mid calf.  She experiences numbness and tingling down her leg. She states that her pain flares up and no specific movements exacerbate or alleviate her symptoms. She voices no further concerns at this time.     Review of Systems:   A 10-point review of systems was obtained and is negative except for as noted in the HPI.     Medications:   Current Outpatient Prescriptions:      etonogestrel (NEXPLANON) 68 MG IMPL, 1 each by Subdermal route once, Disp: , Rfl:      fluticasone (FLONASE) 50 MCG/ACT spray, Spray 2 sprays into both nostrils daily, Disp: 1 Bottle, Rfl: 0     ibuprofen (ADVIL/MOTRIN) 800 MG tablet, Take 1 tablet (800 mg) by mouth every 8 hours as needed for moderate pain, Disp: 30 tablet, Rfl: 1    Allergies:  Allergies   Allergen Reactions     Latex Rash     Past Medical History:  No past medical history on file.    Past Surgical History:  No  "past surgical history on file.     Social History:    Social History     Marital status: Single     Spouse name: N/A     Number of children: N/A     Years of education: N/A     Social History Main Topics     Smoking status: Never Smoker     Smokeless tobacco: Never Used     Alcohol use Yes      Comment: Occasional     Drug use: No     Sexual activity: Not Asked     Other Topics Concern     None     Social History Narrative     Family History:  No family history on file.    Physical Examination:  Blood pressure 126/77, pulse 101, height 1.575 m (5' 2\"), weight 64.4 kg (142 lb), not currently breastfeeding.  General  - normal appearance, in no obvious distress  CV  - normal peripheral perfusion  Pulm  - normal respiratory pattern, non-labored  Musculoskeletal - lumbar spine  - stance: slow to rise and sit  - inspection: normal bone and joint alignment, no obvious scoliosis  - palpation: No lumbar paraspinal tenderness. Central left gluteal tenderness to palpation.   - ROM: Pain with extension. Flexion is normal. Pain with left side bending and extension.    - strength: lower extremities 5/5 in all planes  - special tests:  (+) straight leg raise bilaterally, left side  (+) slump test, left side  Neuro  - patellar and Achilles DTRs 2+ bilaterally, no sensory or motor deficit, grossly normal coordination, normal muscle tone  Skin  - no ecchymosis, erythema, warmth, or induration, no obvious rash  Psych  - interactive, appropriate, normal mood and affect    IMAGING: Reviewed in PACS from last year.  No evidence of disc narrowing or degenerative changes of spine. Normal read.    Assessment:   23 year old, female with a history of sciatic back pain with acute episode of sciatic pain effecting left lower extremity. Given severity today and recurrent episode, MRI warranted to further investigate pathology.  Suspected disc bulge with impingement at S1.     Diagnosis: Acute left gluteal pain with radiculopathy.     Plan:   - " Scheduled an MRI of the lumbar spine   - Prescribed a medrol dose selina  - Recommended heat therapy and conservative management  - Continue at home exercises obtained last year   - Follow up with me after the MRI to discuss the results and further treatment options.     I, Rajat Alvarenga DO, have reviewed the above note and agree with the scribe's notation as written.    Scribe Disclosure:   I, Eulogio Quintanilla, am serving as a scribe to document services personally performed by Rajat Alvarenga DO at this visit, based upon the provider's statements to me. All documentation has been reviewed by the aforementioned provider prior to being entered into the official medical record.      Again, thank you for allowing me to participate in the care of your patient.      Sincerely,    Rajat Alvarenga DO

## 2018-11-16 NOTE — PROGRESS NOTES
The patient presents with a history of chronic tonsillitis and tonsil hypertrophy.  The patient has had chronic problems with these difficulties over the past two years and she recently was treated in [place] for a severe acute tonsillitis and again in the past two months a severe tonsillitis with an abscess.  The patient responds to medical therapy with antibiotics, but the patient's symptoms will soon recur after the medications are completed.  The patient would like to proceed with a tonsillectomy.       All other systems were reviewed and they are either negative or they are not directly pertinent to this Otolaryngology examination.      Past Medical History:    No past medical history on file.    Past Surgical History:    No past surgical history on file.    Medications:      Current Outpatient Prescriptions:      etonogestrel (NEXPLANON) 68 MG IMPL, 1 each by Subdermal route once, Disp: , Rfl:      fluticasone (FLONASE) 50 MCG/ACT spray, Spray 2 sprays into both nostrils daily, Disp: 1 Bottle, Rfl: 0     ibuprofen (ADVIL/MOTRIN) 800 MG tablet, Take 1 tablet (800 mg) by mouth every 8 hours as needed for moderate pain, Disp: 30 tablet, Rfl: 1     methylPREDNISolone (MEDROL DOSEPAK) 4 MG tablet, Follow package instructions, Disp: 21 tablet, Rfl: 0    Allergies:    Latex    Physical Examination:    The patient is a well developed, well nourished female in no apparent distress.  She is normocephalic, atraumatic with pupils equally round and reactive to light.    Oral Cavity Examination: Norml mucosa with no masses or lesions.  The tonsils are 3+ and cryptic  Nasal Examination: Normal mucosa with no masses or lesions  Neurological: Facial nerve function intact and symmetric  Integumentary: No lesions on the skin of the head or neck    Assessment and Plan:    The patient presents with a history of chronic tonsillitis and tonsillar hypertrophy.  The patient and I have discussed medical and surgical treatment  alternatives. Due to the recurrent nature of her infections, she will be referred to Dr. Ilir Delgado for possible surgical management of the condition.       CC: Dr. Ilir Delgado       Answers for HPI/ROS submitted by the patient on 11/16/2018   PHQ-2 Score: 0

## 2018-11-16 NOTE — LETTER
11/16/2018       RE: Esperanza Vidales  [address]  [facility] [address]     Dear Colleague,    Thank you for referring your patient, Esperanza Vidales, to the [facility] EAR NOSE AND THROAT at [facility]. Please see a copy of my visit note below.    The patient presents with a history of chronic tonsillitis and tonsil hypertrophy.  The patient has had chronic problems with these difficulties over the past two years and she recently was treated in [place] for a severe acute tonsillitis and again in the past two months a severe tonsillitis with an abscess.  The patient responds to medical therapy with antibiotics, but the patient's symptoms will soon recur after the medications are completed.  The patient would like to proceed with a tonsillectomy.       All other systems were reviewed and they are either negative or they are not directly pertinent to this Otolaryngology examination.      Past Medical History:    No past medical history on file.    Past Surgical History:    No past surgical history on file.    Medications:      Current Outpatient Prescriptions:      etonogestrel (NEXPLANON) 68 MG IMPL, 1 each by Subdermal route once, Disp: , Rfl:      fluticasone (FLONASE) 50 MCG/ACT spray, Spray 2 sprays into both nostrils daily, Disp: 1 Bottle, Rfl: 0     ibuprofen (ADVIL/MOTRIN) 800 MG tablet, Take 1 tablet (800 mg) by mouth every 8 hours as needed for moderate pain, Disp: 30 tablet, Rfl: 1     methylPREDNISolone (MEDROL DOSEPAK) 4 MG tablet, Follow package instructions, Disp: 21 tablet, Rfl: 0    Allergies:    Latex    Physical Examination:    The patient is a well developed, well nourished female in no apparent distress.  She is normocephalic, atraumatic with pupils equally round and reactive to light.    Oral Cavity Examination: Norml mucosa with no masses or lesions.  The tonsils are 3+ and cryptic  Nasal Examination: Normal mucosa with no masses or  lesions  Neurological: Facial nerve function intact and symmetric  Integumentary: No lesions on the skin of the head or neck    Assessment and Plan:    The patient presents with a history of chronic tonsillitis and tonsillar hypertrophy.  The patient and I have discussed medical and surgical treatment alternatives. Due to the recurrent nature of her infections, she will be referred to Dr. Ilir Delgado for possible surgical management of the condition.       Answers for HPI/ROS submitted by the patient on 11/16/2018   PHQ-2 Score: 0      Leroy Khan MD      CC: Dr. Ilir Delgado

## 2018-11-16 NOTE — NURSING NOTE
"Chief Complaint   Patient presents with     RECHECK     tonsillitis    \Height 1.61 m (5' 3.39\"), weight 65.8 kg (145 lb), not currently breastfeeding.    Nabil Yusuf LPN    "

## 2018-11-16 NOTE — MR AVS SNAPSHOT
After Visit Summary   11/16/2018    Esperanza Vidales    MRN: [id number]           Patient Information     Date Of Birth          1995        Visit Information        Provider Department      11/16/2018 2:30 PM Rajat Alvarenga [facility]        Today's Diagnoses     Radicular pain of left lower extremity    -  1      Care Instructions    Low back pain     What is low back pain?    Low back pain is pain and stiffness in the lower back.  It is one of the most common reasons people miss work.      How does it occur?    Low back pain is usually caused when a ligament or muscle holding a vertebra in its proper position is strained.  Vertebrae are bones that make up the spinal column through which the spinal cord passes.  When these muscles or ligaments become weak, the spine loses its ability, resulting in pain.  Because nerves reach all parts of the body from the spinal cord, back problems can lead to pain or weakness in almost any part of the body.      Low back pain can occur if your job involves lifting and carrying heavy objects, or if you spend a lot of time sitting or standing in one position or bending over.  It can be caused by a fall or by unusually strenuous exercise.  It can be brought on by the tension and stress that causes headaches in some people.  It can even be brought on by violent sneezing or coughing.      People who are overweight may have low back pain because of the added stress on their back.      Back pain may occur when the muscles, joints, bones and connective tissues in the back become inflamed as a result of an infection or an immune system problem.  Arthritic disorders as well as some congenital and degenerative conditions may cause back pain.      Back pain accompanied by loss of bladder or bowel control, difficulty moving your legs, or numbness or tingling in your arms or legs may indicate an injury to your spine and nerves,  which requires immediate medical treatment.      What are the symptoms?    Symptoms include:      Pain in the back or legs    Stiffness and limited motion    The pain may be continuous or may occur in certain positions.  It may be aggravated by coughing, sneezing, bending, twisting, or straining during a bowel movement. The pain may occur in only one spot or may spread to other areas, most commonly down the buttocks and into the back of the thigh.     A low back strain typically does not produce pain past the knee into the calf or foot.  Tingling and numbness in the calf or foot may indicate a herniated disk or pinched nerve.      How is it diagnosed?    Your healthcare provider will review your medical history and examine you.  He or she may order X-rays.  In certain situations, a myelogram, CT scan, or MRI may be ordered.    How is it treated?    The early stages of back pain with muscle spasms should be treated with ice packs for 20-30 minutes every 4 to 6 hours for the first 2 to 3 days. You m ay lie on a frozen gel pack, crushed ice, or a bag of frozen peas.    The following are always to treat low back pain:      After the initial injury, applying heat from a heating pad or hot water bottle    Resting in bed on a firm mattress.  Often it helps to lie on your back with your knees raised.  However, isome people prefer to lie on their side with their knees bent.      Taking aspirin, ibuprofen, or other anti-inflammatory medications; muscle relaxants; or other pain medications if recommended by your healthcare provider (adults aged 65 years and older should not take non-steroidal anti-inflammatory medicine for more than 7 days without their healthcare provider's approval).    Having your back massaged by a trained person    Having traction, if recommended by your provider    Wearing a belt or corset to support your back    Talking with a counselor, if your back pain is related to tension caused by emotional  problems.    Beginning a program of physical therapy, or exercising on your own.  Begin a regular exercise program to gently stretch and strengthen your muscles as soon as you can.  Your healthcare provider or physical therapist can recommend exercises that will not only help you feel better but will strengthen your muscles and help avoid back trouble later.      When the pain subsides, ask your healthcare provider about starting an exercise program such as the following:       Exercise moderately every day, using stretching and warm-up exercises suggested by your provider or physical therapist.    Exercise vigorously for about 30 minutes two or three times a week by walking, swimming, using a stationary bicycle, or doing low-impact aerobics.    Participating regularly in an exercise program will not only help your back, it will also help keep you healthier overall.     How long will the effects last?      The effects of back pain last as long as the cause exists or until your body recovers from the strain, usually a day or two but sometimes weeks.     How can I take care of myself?    In addition to the treatment described above, keep in mind these suggestions:      Use an electric heating pad on a low setting (or a hot water bottle wrapped in a towel to avoid burning yourself) for 20 to 30 minutes.  Don't let the heating pad get too hot, and don't fall asleep with it.  You could get a burn.     Try putting an ice pack wrapped in a towel on your back for 20 minutes, one to four times a day.  Set an alarm to avoid frostbite from using the ice pack too long.    Put a pillow under your knees when you are lying down.    Sleep without a pillow under your head.    Lose weight if you are overweight.    Practice good posture.  Stand with your head up, shoulders straight and chest forward, weight balanced evenly on both feet, and pelvis tucked in.     Pain is the best way to  the pace you should set in increasing your  activity and exercise.  Minor discomfort, stiffness, soreness, and mild aches need not interfere with activity.  However, limit your activity temporarily if:      your symptoms return    the pain increases when you are more active    the pain increases within 24 hours after a new or higher level of activity    When can I return to my sport or activity?    The goal of rehabilitation is to return you to your sport or activity as soon as safely possible. If you return too soon you may worsen your injury, which could lead to permanent damage.  Everyone recovers from injury at different rate.  Return to your sport will be determined by how soon your back recovers, not by how many days or weeks it has been since your injury occurred.  In general, the longer you have symptoms before you start treatment, the longer it will take to get better.      It is important that you have fully recovered from your low back pain before you return to your sport or any strenuous activity.  You must be able to have the same range of motion that you had before your injury.  You must be able to run, jump and twist without pain.      What can I do to help prevent low back pain?    You can reduce the strain on your back by doing the following:      Don't push with your arms when you move a heavy object.  Turn around and push backwards so the strain is taken by your legs.     Whenever you sit, sit in a straight-backed chair and hold your spine against the back of the chair.     Bend your knees and hips and keep your back straight when you lift a heavy object.     Avoid lifting heavy objects higher than your waist    Hold packages you carry close to your body, with your arms bent.    Use a footrest for one foot when you stand or sit in one spot for a long time.  This keeps your back straight.    Bend your knees when you bend over.    Sit close to the pedals when you drive and use your seat belt and a hard backrest or pillow.     Lie on your side  with your knees bent when you sleep or rest.  It may help to put a pillow between your knees.    Put a pillow under your knees when you sleep on your back.    Raise the foot of the bed 8 inches to discourage sleeping on your stomach unless you have other problems that require that you keep your head elevated.      To rest your back, hold each of these positions for 5 minutes or longer:  Lie on your back, bend your knees, and put pillows under your knees.    Lie on your back, put a pillow under your neck, bend your knees to a 90-degree angle, and put your lower legs and feet on a chair.    Lie on your back, bend your knees, and bring one knee up to your chest and hold it there.  Repeat with the other knee, then bring both knees to your chest.  When holding your knee to your chest, grab your thigh rather than your lower leg to avoid over flexing your knee.           Exercises that stretch and strengthen the muscles of your abdomen and spine can help prevent back problems. Strong back and abdominal muscles help you keep good posture, with your spine in its correct position.    If your muscles are tight, take a warm shower or bath before doing the exercises. Exercise on a rug or mat. Wear loose clothing. Don t wear shoes. Stop doing any exercise that causes pain until you have talked with your healthcare provider.    Ask your provider or physical therapist to help you develop an exercise program. Ask your provider how many times a week you need to do the exercises. Remember to start slowly.    Excerpts from: The Sports Medicine Patient Advisor 3rd edition. Copyright 2010 [employer].     Low Back Pain Exercises    EXERCISES  These exercises are intended only as suggestions. Be sure to check with your provider before starting the exercises.    Standing hamstring stretch: Put the heel of one leg on a stool about 15 inches high. Keep your leg straight. Lean forward, bending at the hips until you feel a mild  stretch in the back of your thigh. Make sure you do not roll your shoulders or bend at the waist when doing this. You want to stretch your leg, not your lower back. Hold the stretch for 15 to 30 seconds. Repeat with each leg 3 times.    Cat and camel: Get down on your hands and knees. Let your stomach sag, allowing your back to curve downward. Hold this position for 5 seconds. Then arch your back and hold for 5 seconds. Do 2 sets of 15.    Quadruped arm and leg raise: Get down on your hands and knees. Pull in your belly button and tighten your abdominal muscles to stiffen your spine. While keeping your abdominals tight, raise one arm and the opposite leg away from you. Hold this position for 5 seconds. Lower your arm and leg slowly and change sides. Do this 10 times on each side.    Pelvic tilt: Lie on your back with your knees bent and your feet flat on the floor. Pull your belly button in towards your spine and push your lower back into the floor, flattening your back. Hold this position for 15 seconds, then relax. Repeat 5 to 10 times.  Partial curl: Lie on your back with your knees bent and your feet flat on the floor. Draw in your abdomen and tighten your stomach muscles. With your hands stretched out in front of you, curl your upper body forward until your shoulders clear the floor. Hold this position for 3 seconds. Don't hold your breath. It helps to breathe out as you lift your shoulders. Relax back to the floor. Repeat 10 times. Build to 2 sets of 15. To challenge yourself, clasp your hands behind your head and keep your elbows out to your sides.    Gluteal stretch: Lie on your back with both knees bent. Rest your right ankle over the knee of your left leg. Grasp the thigh of the left leg and pull toward your chest. You will feel a stretch along the buttocks and possibly along the outside of your hip. Hold the stretch for 15 to 30 seconds. Then repeat the exercise with your left ankle over your right knee.  Do the exercise 3 times with each leg.    Extension exercise  Lie face down on the floor for 5 minutes. If this hurts too much, lie face down with a pillow under your stomach. This should relieve your leg or back pain. When you can lie on your stomach for 5 minutes without a pillow, you can continue with Part B of this exercise.    After lying on your stomach for 5 minutes, prop yourself up on your elbows for another 5 minutes. If you can do this without having more leg or buttock pain, you can start doing part C of this exercise.    Lie on your stomach with your hands under your shoulders. Then press down on your hands and extend your elbows while keeping your hips flat on the floor. Hold for 1 second and lower yourself to the floor. Do 3 to 5 sets of 10 repetitions. Rest for 1 minute between sets. You should have no pain in your legs when you do this, but it is normal to feel some pain in your lower back.    Do this exercise several times a day.    Side plank: Lie on your side with your legs, hips, and shoulders in a straight line. Prop yourself up onto your forearm with your elbow directly under your shoulder. Lift your hips off the floor and balance on your forearm and the outside of your foot. Try to hold this position for 15 seconds and then slowly lower your hip to the ground. Switch sides and repeat. Work up to holding for 1 minute. This exercise can be made easier by starting with your knees and hips flexed toward your chest.    EXERCISES TO AVOID     It s best to avoid the following exercises because they strain the lower back:    Exercises in which you lie on your back and raise and lower both legs together    Full sit-ups or sit-ups with straight legs    Hip twists    SPORTS AND OTHER ACTIVITIES    In addition to strengthening your back muscles, it s helpful to keep your entire body in shape. Good activities for people with back problems include:      Walking    Bicycling    Swimming    Cross-country  skiing    Yoga    Ja Chi    Pilates    Some sports can hurt your back because of rough contact, twisting, sudden impact, or direct stress on your back. Sports that may be dangerous to your back include:      Football    Soccer    Volleyball    Handball    Golf    Weight lifting    Trampoline    Tobogganing    Sledding    Snowmobiling    Snowboarding    Ice hockey            Try lifting both knees to chest as well. This is a great stretch first thing in the morning, while still in bed.           Follow-ups after your visit        Your next 10 appointments already scheduled     Nov 16, 2018  4:30 PM CST   (Arrive by 4:15 PM)   Return Visit with Leroy Khan MD   [facility] ([facility])    [address]  4th Floor  [facility] [id number] [phone]            Nov 18, 2018  4:45 PM CST   MR LUMBAR SPINE W/O CONTRAST with [id number]   [facility] ([facility])    [address]  1st Floor  [facility] [id number] [phone]           How do I prepare for my exam? (Food and drink instructions) **If you will be receiving sedation or general anesthesia, please see special notes below.**  How do I prepare for my exam? (Other instructions) Take your medicines as usual, unless your doctor tells you not to. Please remove any body piercings and hair extensions before you arrive. Follow your doctor s orders. If you do not, we may have to postpone your exam. You may or may not receive IV contrast for this exam pending the discretion of the Radiologist.  You do not need to do anything special to prepare. **If you will be receiving sedation or general anesthesia, please see special notes below.**  What should I wear:  The MRI machine uses a strong magnet. Please wear clothes without metal (snaps, zippers). A sweatsuit works well, or we may give you a hospital gown.  How long does the exam take: Most tests take 30 to  60 minutes.  HOWEVER, IF YOUR DOCTOR PRESCRIBES ANESTHESIA please plan on spending four to five hours in the recovery room.  What should I bring: Bring a list of your current medicines to your exam (including vitamins, minerals and over-the-counter drugs). Also bring the results of similar scans you may have had.  Do I need a : **If you will be receiving sedation or general anesthesia, please see special notes below.**  What should I do after the exam? No Restrictions, You may resume normal activities.  What is this test: MRI (magnetic resonance imaging) uses a strong magnet and radio waves to look inside the body. An MRA (magnetic resonance angiogram) does the same thing, but it lets us look at your blood vessels. A computer turns the radio waves into pictures showing cross sections of the body, much like slices of bread. This helps us see any problems more clearly.  Who should I call with questions: Please call the Imaging Department at your exam site with any questions. Directions, parking instructions, and other information is available on our website, [email or web address].  How do I prepare if I m having sedation or anesthesia? **IMPORTANT** THE INSTRUCTIONS BELOW ARE ONLY FOR THOSE PATIENTS WHO HAVE BEEN TOLD THEY WILL RECEIVE SEDATION OR GENERAL ANESTHESIA DURING THEIR MRI PROCEDURE:  IF YOU WILL RECEIVE SEDATION (take medicine to help you relax during your exam): You must get the medicine from your doctor before you arrive. Bring the medicine to the exam. Do not take it at home. Arrive one hour early. Bring someone who can take you home after the test. Your medicine will make you sleepy. After the exam, you may not drive, take a bus or take a taxi by yourself. No eating 8 hours before your exam. You may have clear liquids up until 4 hours before your exam. (Clear liquids include water, clear tea, black coffee and fruit juice without pulp.)  IF YOU WILL RECEIVE ANESTHESIA (be asleep for your exam):  Arrive 1 1/2 hours early. Bring someone who can take you home after the test. You may not drive, take a bus or take a taxi by yourself. No eating 8 hours before your exam. You may have clear liquids up until 4 hours before your exam. (Clear liquids include water, clear tea, black coffee and fruit juice without pulp.) You will spend four to five hours in the recovery room.            2018 11:00 AM CST   Return Walk In Ortho with Rajat Alvarenga DO   [facility] ([facility])    [address]  [address] [facility] [address] [phone]            2018  4:15 PM CST   (Arrive by 4:00 PM)   New Patient Visit with Philip Hatch MD   [facility] ([facility])    [address] [place] [address]  [address] [facility] [id number] [phone]              Future tests that were ordered for you today     Open Future Orders        Priority Expected Expires Ordered    MRI Lumbar spine w/o contrast Routine  2019            Who to contact     Please call your clinic at [phone] to:    Ask questions about your health    Make or cancel appointments    Discuss your medicines    Learn about your test results    Speak to your doctor            Additional Information About Your Visit        MyChart Information     [employer] is an electronic gateway that provides easy, online access to your medical records. With [employer], you can request a clinic appointment, read your test results, renew a prescription or communicate with your care team.     To sign up for [employer] visit the website at [email or web address]   You will be asked to enter the access code listed below, as well as some personal information. Please follow the directions to create your username and password.     Your access code is: [email or web address]  Expires: 2018  5:30 AM     Your access code will  in 90 days. If you need help  "or a new code, please contact your [facility] or call [phone] for assistance.        Care EveryWhere ID     This is your Care EveryWhere ID. This could be used by other organizations to access your [place] medical records  [id number]        Your Vitals Were     Pulse Height BMI (Body Mass Index)             101 1.575 m (5' 2\") 25.97 kg/m2          Blood Pressure from Last 3 Encounters:   11/16/18 126/77   11/07/18 118/84   10/16/18 124/72    Weight from Last 3 Encounters:   11/16/18 64.4 kg (142 lb)   10/16/18 64.4 kg (142 lb)   10/15/18 64.4 kg (142 lb)                 Today's Medication Changes          These changes are accurate as of 11/16/18  3:57 PM.  If you have any questions, ask your nurse or doctor.               Start taking these medicines.        Dose/Directions    methylPREDNISolone 4 MG tablet   Commonly known as:  MEDROL DOSEPAK   Used for:  Radicular pain of left lower extremity   Started by:  Rajat Alvarenga DO        Follow package instructions   Quantity:  21 tablet   Refills:  0            Where to get your medicines      These medications were sent to [place], [place] - [address] [place] [id number]     Phone:  [phone]     methylPREDNISolone 4 MG tablet                Primary Care Provider Fax #    Physician No Ref-Primary [phone]       No address on file        Equal Access to Services     JUANY WATTS : Ayana Kendall, waaxda ludayanna, qaybta kaalmada waldo solorio . So [facility] [phone].    ATENCIÓN: Si habla español, tiene a mclean disposición servicios gratuitos de asistencia lingüística. Llame al [phone].    We comply with applicable federal civil rights laws and [place] laws. We do not discriminate on the basis of race, color, national origin, age, disability, sex, sexual orientation, or gender identity.            Thank you!     " Thank you for choosing [facility]  for your care. Our goal is always to provide you with excellent care. Hearing back from our patients is one way we can continue to improve our services. Please take a few minutes to complete the written survey that you may receive in the mail after your visit with us. Thank you!             Your Updated Medication List - Protect others around you: Learn how to safely use, store and throw away your medicines at www.disposemymeds.org.          This list is accurate as of 11/16/18  3:57 PM.  Always use your most recent med list.                   Brand Name Dispense Instructions for use Diagnosis    fluticasone 50 MCG/ACT spray    FLONASE    1 Bottle    Spray 2 sprays into both nostrils daily    Viral URI       ibuprofen 800 MG tablet    ADVIL/MOTRIN    30 tablet    Take 1 tablet (800 mg) by mouth every 8 hours as needed for moderate pain    Viral URI       methylPREDNISolone 4 MG tablet    MEDROL DOSEPAK    21 tablet    Follow package instructions    Radicular pain of left lower extremity       NEXPLANON 68 MG Impl   Generic drug:  etonogestrel      1 each by Subdermal route once

## 2018-11-16 NOTE — MR AVS SNAPSHOT
After Visit Summary   11/16/2018    Esperanza Vidales    MRN: [id number]           Patient Information     Date Of Birth          1995        Visit Information        Provider Department      11/16/2018 4:30 PM Leroy Khan MD [facility] Ear Nose and Throat        Today's Diagnoses     Chronic tonsillitis    -  1    Hypertrophy of tonsils          Care Instructions    1.  You were seen in the ENT Clinic today by Dr. Khan.  If you have any questions or concerns after your appointment, please call [phone]. Press option #1 for scheduling related needs. Press option #3 for Nurse advice.  2.  Please schedule an appointment for the following:   - Dr. Ilir Delgado for further evaluation  3.  Plan is to return to clinic as needed.    Brneda Paulson LPN  [facility]    The patient presents with a history of chronic tonsillitis and tonsillar hypertrophy.  The patient and I have discussed medical and surgical treatment alternatives. Due to the recurrent nature of her infections, she will be referred to Dr. Ilir Delgado for possible surgical management of the condition.           Follow-ups after your visit        Your next 10 appointments already scheduled     Nov 16, 2018  4:30 PM CST   (Arrive by 4:15 PM)   Return Visit with Leroy Khan MD   [facility] Ear Nose and Throat ([facility])    [address] [place] [address]  4th [facility] [address] [phone]            Nov 18, 2018  4:45 PM CST   MR LUMBAR SPINE W/O CONTRAST with [id number]   [facility] ([facility])    [address] [place] [id number] [phone]           How do I prepare for my exam? (Food and drink instructions) **If you will be receiving sedation or general anesthesia, please see special notes below.**  How do I prepare for my exam? (Other instructions) Take your medicines as usual, unless  your doctor tells you not to. Please remove any body piercings and hair extensions before you arrive. Follow your doctor s orders. If you do not, we may have to postpone your exam. You may or may not receive IV contrast for this exam pending the discretion of the Radiologist.  You do not need to do anything special to prepare. **If you will be receiving sedation or general anesthesia, please see special notes below.**  What should I wear:  The MRI machine uses a strong magnet. Please wear clothes without metal (snaps, zippers). A sweatsuit works well, or we may give you a hospital gown.  How long does the exam take: Most tests take 30 to 60 minutes.  HOWEVER, IF YOUR DOCTOR PRESCRIBES ANESTHESIA please plan on spending four to five hours in the recovery room.  What should I bring: Bring a list of your current medicines to your exam (including vitamins, minerals and over-the-counter drugs). Also bring the results of similar scans you may have had.  Do I need a : **If you will be receiving sedation or general anesthesia, please see special notes below.**  What should I do after the exam? No Restrictions, You may resume normal activities.  What is this test: MRI (magnetic resonance imaging) uses a strong magnet and radio waves to look inside the body. An MRA (magnetic resonance angiogram) does the same thing, but it lets us look at your blood vessels. A computer turns the radio waves into pictures showing cross sections of the body, much like slices of bread. This helps us see any problems more clearly.  Who should I call with questions: Please call the Imaging Department at your exam site with any questions. Directions, parking instructions, and other information is available on our website, [email or web address].  How do I prepare if I m having sedation or anesthesia? **IMPORTANT** THE INSTRUCTIONS BELOW ARE ONLY FOR THOSE PATIENTS WHO HAVE BEEN TOLD THEY WILL RECEIVE SEDATION OR GENERAL ANESTHESIA DURING THEIR  MRI PROCEDURE:  IF YOU WILL RECEIVE SEDATION (take medicine to help you relax during your exam): You must get the medicine from your doctor before you arrive. Bring the medicine to the exam. Do not take it at home. Arrive one hour early. Bring someone who can take you home after the test. Your medicine will make you sleepy. After the exam, you may not drive, take a bus or take a taxi by yourself. No eating 8 hours before your exam. You may have clear liquids up until 4 hours before your exam. (Clear liquids include water, clear tea, black coffee and fruit juice without pulp.)  IF YOU WILL RECEIVE ANESTHESIA (be asleep for your exam): Arrive 1 1/2 hours early. Bring someone who can take you home after the test. You may not drive, take a bus or take a taxi by yourself. No eating 8 hours before your exam. You may have clear liquids up until 4 hours before your exam. (Clear liquids include water, clear tea, black coffee and fruit juice without pulp.) You will spend four to five hours in the recovery room.            Nov 20, 2018 11:00 AM CST   Return Walk In Ortho with Rajat Alvarenga DO   [facility] Orthopa[facility] Walk In [facility] ([facility])    [address] [place] [id number] [phone]            Dec 11, 2018  9:30 AM CST   (Arrive by 9:15 AM)   New Patient Visit with Ilir Delgado MD   [facility] Ear Nose and Throat ([facility])    [address] [place] [id number] [phone]              Future tests that were ordered for you today     Open Future Orders        Priority Expected Expires Ordered    MRI Lumbar spine w/o contrast Routine  11/16/2019 11/16/2018            Who to contact     Please call your clinic at [phone] to:    Ask questions about your health    Make or cancel appointments    Discuss your medicines    Learn about your test results    Speak to your doctor            Additional  "Information About Your Visit        [employer] Information     Tachyus is an electronic gateway that provides easy, online access to your medical records. With Tachyus, you can request a clinic appointment, read your test results, renew a prescription or communicate with your care team.     To sign up for Tachyus visit the website at [email or web address]   You will be asked to enter the access code listed below, as well as some personal information. Please follow the directions to create your username and password.     Your access code is: [email or web address]  Expires: 2018  5:30 AM     Your access code will  in 90 days. If you need help or a new code, please contact your [facility] or call [phone] for assistance.        Care EveryWhere ID     This is your Care EveryWhere ID. This could be used by other organizations to access your [place] medical records  [id number]        Your Vitals Were     Height BMI (Body Mass Index)                1.61 m (5' 3.39\") 25.37 kg/m2           Blood Pressure from Last 3 Encounters:   18 126/77   18 118/84   10/16/18 124/72    Weight from Last 3 Encounters:   18 65.8 kg (145 lb)   18 64.4 kg (142 lb)   10/16/18 64.4 kg (142 lb)              Today, you had the following     No orders found for display         Today's Medication Changes          These changes are accurate as of 18  4:24 PM.  If you have any questions, ask your nurse or doctor.               Start taking these medicines.        Dose/Directions    methylPREDNISolone 4 MG tablet   Commonly known as:  MEDROL DOSEPAK   Used for:  Radicular pain of left lower extremity   Started by:  Rajat Alvarenga, DO        Follow package instructions   Quantity:  21 tablet   Refills:  0            Where to get your medicines      These medications were sent to [place], [place] - [address] [place] [address] " [id number]     Phone:  [phone]     methylPREDNISolone 4 MG tablet                Primary Care Provider Fax #    Physician No Ref-Primary [phone]       No address on file        Equal Access to Services     JUANY WATTS : Hadii aad ku hadanne Kendall, yao harris, aaron kadafne solorio, waldo burrines chang. So [facility] [phone].    ATENCIÓN: Si habla español, tiene a mclean disposición servicios gratuitos de asistencia lingüística. Llame al [phone].    We comply with applicable federal civil rights laws and [place] laws. We do not discriminate on the basis of race, color, national origin, age, disability, sex, sexual orientation, or gender identity.            Thank you!     Thank you for choosing [facility] EAR NOSE AND THROAT  for your care. Our goal is always to provide you with excellent care. Hearing back from our patients is one way we can continue to improve our services. Please take a few minutes to complete the written survey that you may receive in the mail after your visit with us. Thank you!             Your Updated Medication List - Protect others around you: Learn how to safely use, store and throw away your medicines at www.disposemymeds.org.          This list is accurate as of 11/16/18  4:24 PM.  Always use your most recent med list.                   Brand Name Dispense Instructions for use Diagnosis    fluticasone 50 MCG/ACT spray    FLONASE    1 Bottle    Spray 2 sprays into both nostrils daily    Viral URI       ibuprofen 800 MG tablet    ADVIL/MOTRIN    30 tablet    Take 1 tablet (800 mg) by mouth every 8 hours as needed for moderate pain    Viral URI       methylPREDNISolone 4 MG tablet    MEDROL DOSEPAK    21 tablet    Follow package instructions    Radicular pain of left lower extremity       NEXPLANON 68 MG Impl   Generic drug:  etonogestrel      1 each by Subdermal route once

## 2018-11-16 NOTE — TELEPHONE ENCOUNTER
FUTURE VISIT INFORMATION      FUTURE VISIT INFORMATION:    Date: 11/20/18    Time:     Location: [facility]  REFERRAL INFORMATION:    Referring provider:  self    Referring providers clinic:      Reason for visit/diagnosis  Pain upper part of leg. Unknown cause for 2 days. MRI a few years ago in [place]. No prior surgeries. Appt Per Patient.    RECORDS REQUESTED FROM:       Clinic name Comments Records Status Imaging Status     n/a n/a

## 2018-11-16 NOTE — PATIENT INSTRUCTIONS
1.  You were seen in the ENT Clinic today by Dr. Khan.  If you have any questions or concerns after your appointment, please call [phone]. Press option #1 for scheduling related needs. Press option #3 for Nurse advice.  2.  Please schedule an appointment for the following:   - Dr. Ilir Delgado for further evaluation  3.  Plan is to return to clinic as needed.    Brenda Paulson LPN  [facility]    The patient presents with a history of chronic tonsillitis and tonsillar hypertrophy.  The patient and I have discussed medical and surgical treatment alternatives. Due to the recurrent nature of her infections, she will be referred to Dr. Ilir Delgado for possible surgical management of the condition.

## 2018-11-18 ENCOUNTER — RADIANT APPOINTMENT (OUTPATIENT)
Dept: MRI IMAGING | Facility: CLINIC | Age: 23
End: 2018-11-18
Attending: FAMILY MEDICINE
Payer: COMMERCIAL

## 2018-11-18 DIAGNOSIS — M54.10 RADICULAR PAIN OF LEFT LOWER EXTREMITY: ICD-10-CM

## 2018-11-20 ENCOUNTER — OFFICE VISIT (OUTPATIENT)
Dept: OTOLARYNGOLOGY | Facility: CLINIC | Age: 23
End: 2018-11-20
Payer: COMMERCIAL

## 2018-11-20 ENCOUNTER — OFFICE VISIT (OUTPATIENT)
Dept: ORTHOPEDICS | Facility: CLINIC | Age: 23
End: 2018-11-20
Payer: COMMERCIAL

## 2018-11-20 VITALS — HEIGHT: 64 IN | WEIGHT: 145 LBS | BODY MASS INDEX: 24.75 KG/M2

## 2018-11-20 VITALS — WEIGHT: 143 LBS | BODY MASS INDEX: 24.93 KG/M2

## 2018-11-20 DIAGNOSIS — G57.02 PYRIFORMIS SYNDROME, LEFT: ICD-10-CM

## 2018-11-20 DIAGNOSIS — J36 TONSILLAR ABSCESS: Primary | ICD-10-CM

## 2018-11-20 DIAGNOSIS — M54.10 RADICULAR PAIN OF LEFT LOWER EXTREMITY: Primary | ICD-10-CM

## 2018-11-20 ASSESSMENT — PAIN SCALES - GENERAL: PAINLEVEL: NO PAIN (0)

## 2018-11-20 NOTE — MR AVS SNAPSHOT
After Visit Summary   11/20/2018    Esperanza Vidalse    MRN: [id number]           Patient Information     Date Of Birth          1995        Visit Information        Provider Department      11/20/2018 3:30 PM Ilir Delgado MD [facility] Ear Nose and Throat        Today's Diagnoses     Tonsillar abscess    -  1      Care Instructions    I provided patient the nisreen-op worksheet  ENT Adult Default Surgery Request.   I encouraged patient to review the check list and highlighted the following points:  1) Complete History and Physical within 30 days of surgery, either with PAC clinic or family clinic.   If completed outside of  Physicians, please have provider send all of your results to the hospital before the surgery.  2) Same-day surgery, must arrange for an adult to take you home and stay with you for the first 24 hours after surgery.  3) Discuss with primary care provider what medicines are safe before surgery.   Example, Coumadin, Plavix, Aspirin, Ibuprofen/Motrin, herbal products, Vitamin E and Fish oil may possibly be discontinued 7 days prior to surgery date.  4) Stop drinking alcohol at least 24 hours before surgery.  5) Quit or at least cut down smoking as it higher your risks for infection after surgery. No chewing tobacco for at least 8 hours before surgery.  6) Take a bath or shower the night before and morning of surgery.   Use antiseptic soap.  7) No food or drink after midnight. Follow your surgeon s orders for eating and drinking.    Please following surgeon s instructions as if you don t, your surgery could be canceled.            Follow-ups after your visit        Who to contact     Please call your clinic at [phone] to:    Ask questions about your health    Make or cancel appointments    Discuss your medicines    Learn about your test results    Speak to your doctor            Additional Information About Your Visit        Care EveryWhere ID     This is your Care  EveryWhere ID. This could be used by other organizations to access your [place] medical records  [id number]        Your Vitals Were     BMI (Body Mass Index)                   24.93 kg/m2            Blood Pressure from Last 3 Encounters:   11/16/18 126/77   11/07/18 118/84   10/16/18 124/72    Weight from Last 3 Encounters:   11/20/18 64.9 kg (143 lb)   11/20/18 65.8 kg (145 lb)   11/16/18 65.8 kg (145 lb)              We Performed the Following     [facility]-Operative Worksheet (ENT Adult Default Surgery Request)        Primary Care Provider Fax #    Physician No Ref-Primary [phone]       No address on file        Equal Access to Services     JUANY WATTS : Ayana Kendall, yao harris, aaron solorio, waldo chang. So [facility] [phone].    ATENCIÓN: Si habla español, tiene a mclean disposición servicios gratuitos de asistencia lingüística. Llame al [phone].    We comply with applicable federal civil rights laws and [place] laws. We do not discriminate on the basis of race, color, national origin, age, disability, sex, sexual orientation, or gender identity.            Thank you!     Thank you for choosing [facility] EAR NOSE AND THROAT  for your care. Our goal is always to provide you with excellent care. Hearing back from our patients is one way we can continue to improve our services. Please take a few minutes to complete the written survey that you may receive in the mail after your visit with us. Thank you!             Your Updated Medication List - Protect others around you: Learn how to safely use, store and throw away your medicines at www.disposemymeds.org.          This list is accurate as of 11/20/18  4:39 PM.  Always use your most recent med list.                   Brand Name Dispense Instructions for use Diagnosis    fluticasone 50 MCG/ACT spray    FLONASE    1 Bottle    Spray 2 sprays into both nostrils daily    Viral URI       ibuprofen  800 MG tablet    ADVIL/MOTRIN    30 tablet    Take 1 tablet (800 mg) by mouth every 8 hours as needed for moderate pain    Viral URI       methylPREDNISolone 4 MG tablet    MEDROL DOSEPAK    21 tablet    Follow package instructions    Radicular pain of left lower extremity       NEXPLANON 68 MG Impl   Generic drug:  etonogestrel      1 each by Subdermal route once

## 2018-11-20 NOTE — NURSING NOTE
Chief Complaint   Patient presents with     Consult     surgical consult -tonsilectomy      Weight 64.9 kg (143 lb), not currently breastfeeding.    Nabil Yusuf LPN

## 2018-11-20 NOTE — PROGRESS NOTES
SPORTS & ORTHOPEDIC WALK-IN FOLLOW-UP VISIT 11/20/2018    Interval History:     Follow up reason: L spine MRI results    Date of injury: No event    Date last seen: 10/16/18    Following Therapeutic Plan: Yes     Pain: Improving    Function: Unchanged    Interval History: Medrol dose pack seems to be helping with pain. Has been doing HEP  With no issues.    Medical History:    Any recent changes to your medical history? No    Any new medication prescribed since last visit? No

## 2018-11-20 NOTE — NURSING NOTE
I provided patient the nisreen-op worksheet  ENT Adult Default Surgery Request.   I encouraged patient to review the check list and highlighted the following points:  1) Complete History and Physical within 30 days of surgery, either with PAC clinic or family clinic.   If completed outside of  Physicians, please have provider send all of your results to the hospital before the surgery.  2) Same-day surgery, must arrange for an adult to take you home and stay with you for the first 24 hours after surgery.  3) Discuss with primary care provider what medicines are safe before surgery.   Example, Coumadin, Plavix, Aspirin, Ibuprofen/Motrin, herbal products, Vitamin E and Fish oil may possibly be discontinued 7 days prior to surgery date.  4) Stop drinking alcohol at least 24 hours before surgery.  5) Quit or at least cut down smoking as it higher your risks for infection after surgery. No chewing tobacco for at least 8 hours before surgery.  6) Take a bath or shower the night before and morning of surgery.   Use antiseptic soap.  7) No food or drink after midnight. Follow your surgeon s orders for eating and drinking.    Please following surgeon s instructions as if you don t, your surgery could be canceled.

## 2018-11-20 NOTE — PROGRESS NOTES
"[facility]  Orthopedics  Rajat Alvarenga, DO  2018     Name: Esperanza Vidales  MRN: [id number]  Age: 23 year old  : 1995  Referring provider: Referred Self     Chief Complaint: Pain of the Lower Back       Date of Injury: 2018    History of Present Illness:   Esperanza Vidales is a 23 year old female who presents today for follow-up regarding left leg and buttocks pain. I last saw the patient on 18 for initial evaluation at which time he reported a history of left leg and buttock pain with sciatica that resolved last year, but has since returned. At that time, I recommended she follow-up after obtaining an MRI of her lumbar spine.    Today, she reports that she has improved since our last visit. 70% resolved at least. She notes that the medrol dose pack has helped alleviate her low back pain to the point where it is mostly gone. She has 2-3 more days of the medrol dose pack left. Has no other concerns today.     Review of Systems:   A 10-point review of systems was obtained and is negative except for as noted in the HPI.     Physical Examination:  Height 1.613 m (5' 3.5\"), weight 65.8 kg (145 lb), not currently breastfeeding.  General  - normal appearance, in no obvious distress  CV  - normal peripheral perfusion  Pulm  - normal respiratory pattern, non-labored  Musculoskeletal - lumbar spine  - stance: normal gait and stance  - inspection: normal bone and joint alignment, no obvious scoliosis  - palpation: No lumbar paraspinal tenderness. Central left gluteal tenderness to palpation in the region of left piriformis.   - ROM: No pain with extension. Flexion is normal. No pain with left side bending and extension.  ROM of hip in external rotation and internal rotation does not provoke pain at pyriformis.  - strength: lower extremities 5/5 in all planes  - special tests:  (-) straight leg raise bilaterally, left side  (-) slump test, left side  Neuro  - patellar and Achilles DTRs 2+ " bilaterally, no sensory or motor deficit, grossly normal coordination, normal muscle tone  Skin  - no ecchymosis, erythema, warmth, or induration, no obvious rash  Psych  - interactive, appropriate, normal mood and affect    Imaging:  MRI Lumbar spine w/o contrast (11/18/18)  Normal lumbar spine.    I have independently reviewed the above imaging studies; the results were discussed with the patient.     Assessment:   23 year old female with left sided low back pain with sciatica. MRI shows no lumbar spine abnormalities. 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 I will provide the patient with home exercises to stretch pyriformis  2. Complete medrol dosepak, 2 additional days.   3. Consider physical therapy if pain does not improve.  4. Follow-up as needed.     I, Rajat Alvarenga DO, have reviewed the above note and agree with the scribe's notation as written.    Scribe Disclosure:   I, Jeremiah Linn, am serving as a scribe to document services personally performed by Rajat Alvarenga DO at this visit, based upon the provider's statements to me. All documentation has been reviewed by the aforementioned provider prior to being entered into the official medical record.

## 2018-11-20 NOTE — LETTER
11/20/2018       RE: Esperanza Vidales  [address]  [facility] [address]     Dear Colleague,    Thank you for referring your patient, Esperanza Vidales, to [facility] at [facility]. Please see a copy of my visit note below.    November 20, 2018        Dear Dr. Khan:    I had the pleasure of meeting Esperanza Vidales in consultation today at the [facility] at your request.       History of Present Illness:    Ms. Vidales is a 23 year old female with recurrent left tonsillar abscesses over the last 12 months. She cannot think of an inciting event to these frequent episodes. Today she is asymptomatic. When she has had a PTA, it has been needle aspirated in the ED.     She is otherwise healthy. No history of bleeding or clotting disorders.     Lives with roommates. College student.     MEDICATIONS:     Current Outpatient Prescriptions   Medication Sig Dispense Refill     etonogestrel (NEXPLANON) 68 MG IMPL 1 each by Subdermal route once       fluticasone (FLONASE) 50 MCG/ACT spray Spray 2 sprays into both nostrils daily 1 Bottle 0     ibuprofen (ADVIL/MOTRIN) 800 MG tablet Take 1 tablet (800 mg) by mouth every 8 hours as needed for moderate pain 30 tablet 1     methylPREDNISolone (MEDROL DOSEPAK) 4 MG tablet Follow package instructions 21 tablet 0       ALLERGIES:    Allergies   Allergen Reactions     Latex Rash       HABITS/SOCIAL HISTORY:    Non-smoker  Drinks EtOH about once a week.   College student.     PAST MEDICAL HISTORY:   Past Medical History:   Diagnosis Date     Chronic tonsillitis september 2017        FAMILY HISTORY:  No family history of bleeding or clotting disorders.    REVIEW OF SYSTEMS:    A 10 point Review of Systems was performed and pertinent positives are noted in the HPI; remaining positives are:  Patient Supplied Answers to Review of Systems  [facility] ROS 11/20/2018   Constitutional  Appetite change   Neurology Dizzy spells, Headache   Ears, Nose, Throat Ear pain, Nasal congestion or drainage, Sore throat, Trouble swallowing   Cardiopulmonary Cough, Chest pain   Musculoskeletal Back pain       PHYSICAL EXAMINATION:    Constitutional:  The patient was unaccompanied, well-groomed, and in no acute distress.    Skin:  Warm and pink.    Neurologic:  Alert and oriented x 3.  CN's III-XII within normal limits.  Voice normal.   Psychiatric:  The patient's affect was calm, cooperative, and appropriate.    Respiratory:  Breathing comfortably without stridor or exertion of accessory muscles.    Eyes: Pupils were equal and reactive.  Extraocular movement intact.    Head:  Normocephalic and atraumatic.  No lesions or scars.    Ears:  Pinnae and tragus non-tender.  EAC's and TM's were clear.     Nose:  Sinuses were non-tender.  Anterior rhinoscopy revealed midline septum and absence of purulence or polyps.    OC/OP:  Normal tongue, floor of mouth, buccal mucosa, and palate.  No lesions or masses on inspection or palpation.  No abnormal lymph tissue in the oropharynx.  The pterygoid region is non-tender.    HP/LX:  Mirror exam of the larynx reveals a sharp epiglottis and mobile arytenoids.  No pooling seen.  The cords themselves appear clear and mobile.   Neck:  Supple with normal laryngeal and tracheal landmarks.  The parotid beds were without masses.  No palpable thyroid.  Lymphatic:  There is no palpable lymphadenopathy in the neck.        IMPRESSION    1. Recurrent tonsillitis and peritonsillar abscess.       PLAN:  We would recommend to proceed with bilateral tonsillectomy given the severity and frequency of her tonsillitis/PTAs. We reviewed the risks,benefits, and alternatives to the procedure and answered her qeustions.     - she will schedule her procedure with our nurse coordinator. She has her actuarial exam on 1/13/2018. We would recommend waiting after the exam for surgery.   -salt water gargles  -  if she develops pharyngitis and she is not improving in a couple of days, contact clinic to consider antibiotic therapy.   Thank you very much for the opportunity to participate in the care of your patient.         ADDENDUM:  I have spoken with and examined Ms. Vidales.  I was  present for thevisit and all procedures and  based on my findings now I agree with the resident s findings and plan of care .  I have edited Dr. dooley's note to reflect my perspective on the findings, assessment, and plan.      Ilir Delgado MD, PhD  [phone]  Otolaryngology Department

## 2018-11-20 NOTE — MR AVS SNAPSHOT
After Visit Summary   11/20/2018    Esperanza Vidales    MRN: [id number]           Patient Information     Date Of Birth          1995        Visit Information        Provider Department      11/20/2018 11:00 AM Rajat Alvarenga DO [facility]        Care Instructions          You may do all of these exercises right away.    Gluteal stretch: Lie on your back with both knees bent. Rest the ankle on your injured side over the knee of your other leg. Grasp the thigh of the leg on the uninjured side and pull toward your chest. You will feel a stretch along the buttocks on the injured side and possibly along the outside of your hip. Hold the stretch for 15 to 30 seconds. Repeat 3 times.  Standing hamstring stretch: Put the heel of the leg on your injured side on a stool about 15 inches high. Keep your leg straight. Lean forward, bending at the hips, until you feel a mild stretch in the back of your thigh. Make sure you don't roll your shoulders or bend at the waist when doing this or you will stretch your lower back instead of your leg. Hold the stretch for 15 to 30 seconds. Repeat 3 times.  Resisted hip abduction: Stand sideways near a door with your injured side further from the door. Tie elastic tubing around the ankle on your injured side. Knot the other end of the tubing and close the knot in the door near the floor. Pull the tubing out to the side, keeping your leg straight. Return to the starting position. Do 2 sets of 15. For more resistance, move farther away from the door.  The plank: Lie on your stomach resting on our forearms. With your legs straight, lift your hips off the floor until they are in line with your shoulders. Support yourself on your forearms and toes. Hold this position for 15 seconds. (If this is too difficult, you can modify it by placing your knees on the floor.) Repeat 3 times. Work up to increasing your hold time to 30 to 60  seconds.  Side plank: Lie on your side with your legs, hips, and shoulders in a straight line. Prop yourself up onto your forearm with your elbow directly under your shoulder. Lift your hips off the floor and balance on your forearm and the outside of your foot. Try to hold this position for 15 seconds and then slowly lower your hip to the ground. Switch sides and repeat. Work up to holding for 1 minute. This exercise can be made easier by starting with your knees and hips flexed toward your chest.  Prone hip extension with bent leg: Lie on your stomach with a pillow under your hips. Bend the knee on your injured side. Draw your belly button in towards your spine and tighten your abdominal muscles. Lift your bent leg off the floor about 6 inches (15 centimeters). Keep your other leg straight. Hold for 5 seconds. Then lower your leg and relax. Do 2 sets of 15.  Clam exercise: Lie on your uninjured side with your hips and knees bent and feet together. Slowly raise your top leg toward the ceiling while keeping your heels touching each other. Hold for 2 seconds and lower slowly. Do 2 sets of 15 repetitions.    WHAT IS PIRIFORMIS SYNDROME?    Piriformis syndrome is a problem that can happen when a muscle deep in the buttock presses on a nerve. This muscle is called the piriformis muscle and the nerve is called the sciatic nerve. You use the piriformis muscle to turn your thigh outward. The sciatic nerve passes through or next to the piriformis muscle as it travels from your back down into your leg. Pressure on the sciatic nerve can cause pain or tingling in the back of the hip and in the leg.    WHAT IS THE CAUSE?    Spasm, or tightening, of the piriformis muscle can put pressure on the nerve. This may happen, for example, if you have been doing a lot of downhill running, kicking, or sitting on hard surfaces.    WHAT ARE THE SYMPTOMS?    The main symptom is pain deep in your buttock. The pain usually goes down across  your lower thigh. You may feel tingling or numbness in the back of your hip and leg. You may have more pain when you turn your thigh outward, like when you sit cross-legged.    HOW IS IT DIAGNOSED?    Your healthcare provider will ask about your symptoms, activities, and medical history and examine you. You may have X-rays or other scans of your back.    HOW IS IT TREATED?    You will need to change or stop doing the activities that cause pain. Your healthcare provider may recommend stretching and strengthening exercises and other types of physical therapy to help you heal.    A mild injury may heal in a few weeks, but a severe injury may take 6 weeks or longer.    HOW CAN I HELP TAKE CARE OF MYSELF?    To help relieve swelling and pain:    Put an ice pack, gel pack, or package of frozen vegetables wrapped in a cloth on the area every 3 to 4 hours for up to 20 minutes at a time.  Take pain medicine, such as acetaminophen, ibuprofen, or other medicine as directed by your provider. Nonsteroidal anti-inflammatory medicines (NSAIDs), such as ibuprofen, may cause stomach bleeding and other problems. These risks increase with age. Read the label and take as directed. Unless recommended by your healthcare provider, do not take for more than 10 days.  Moist heat may help relax your muscles and make it easier to move your hip and leg. Put moist heat on the area for 10 to 15 minutes at a time before you do warm-up and stretching exercises. Moist heat includes heat patches or moist heating pads that you can purchase at most drugstores, a wet washcloth or towel that has been heated in the dryer, or a hot shower. Don t use heat if you have swelling.    Follow your healthcare provider's instructions, including any exercises recommended by your provider. Ask your provider:    How and when you will hear your test results  How long it will take to recover  What activities you should avoid and when you can return to your normal  "activities  How to take care of yourself at home  What symptoms or problems you should watch for and what to do if you have them  Make sure you know when you should come back for a checkup.    HOW CAN I HELP PREVENT PIRIFORMIS SYNDROME?    Warm-up exercises and stretching before activities can help prevent this problem.    Follow safety rules and use any protective equipment recommended for your work or sport.    Developed by [employer].  Published by [employer].  Copyright  2014 [employer] and/or one of its subsidiaries. All rights reserved.              Follow-ups after your visit        Your next 10 appointments already scheduled     Nov 20, 2018  3:30 PM CST   (Arrive by 3:15 PM)   New Patient Visit with Ilir Delgado MD   [facility] ([facility])    [address] [place] [id number] [phone]              Who to contact     Please call your clinic at [phone] to:    Ask questions about your health    Make or cancel appointments    Discuss your medicines    Learn about your test results    Speak to your doctor            Additional Information About Your Visit        Care EveryWhere ID     This is your Care EveryWhere ID. This could be used by other organizations to access your [place] medical records  [id number]        Your Vitals Were     Height BMI (Body Mass Index)                1.613 m (5' 3.5\") 25.28 kg/m2           Blood Pressure from Last 3 Encounters:   11/16/18 126/77   11/07/18 118/84   10/16/18 124/72    Weight from Last 3 Encounters:   11/20/18 65.8 kg (145 lb)   11/16/18 65.8 kg (145 lb)   11/16/18 64.4 kg (142 lb)              Today, you had the following     No orders found for display       Primary Care Provider Fax #    Physician No Ref-Primary [phone]       No address on file        Equal Access to Services     JUANY WATTS AH: yao Arias, aaron barraza " waldo solorioabraham onilizeth bahaan ah. [facility]y [facility] [phone].    ATENCIÓN: Si jmla pia, tiene a mclean disposición servicios gratuitos de asistencia lingüística. Terence al [phone].    We comply with applicable federal civil rights laws and [place] laws. We do not discriminate on the basis of race, color, national origin, age, disability, sex, sexual orientation, or gender identity.            Thank you!     Thank you for choosing [facility]  for your care. Our goal is always to provide you with excellent care. Hearing back from our patients is one way we can continue to improve our services. Please take a few minutes to complete the written survey that you may receive in the mail after your visit with us. Thank you!             Your Updated Medication List - Protect others around you: Learn how to safely use, store and throw away your medicines at www.disposemymeds.org.          This list is accurate as of 11/20/18 11:31 AM.  Always use your most recent med list.                   Brand Name Dispense Instructions for use Diagnosis    fluticasone 50 MCG/ACT spray    FLONASE    1 Bottle    Spray 2 sprays into both nostrils daily    Viral URI       ibuprofen 800 MG tablet    ADVIL/MOTRIN    30 tablet    Take 1 tablet (800 mg) by mouth every 8 hours as needed for moderate pain    Viral URI       methylPREDNISolone 4 MG tablet    MEDROL DOSEPAK    21 tablet    Follow package instructions    Radicular pain of left lower extremity       NEXPLANON 68 MG Impl   Generic drug:  etonogestrel      1 each by Subdermal route once

## 2018-11-20 NOTE — PATIENT INSTRUCTIONS
You may do all of these exercises right away.    Gluteal stretch: Lie on your back with both knees bent. Rest the ankle on your injured side over the knee of your other leg. Grasp the thigh of the leg on the uninjured side and pull toward your chest. You will feel a stretch along the buttocks on the injured side and possibly along the outside of your hip. Hold the stretch for 15 to 30 seconds. Repeat 3 times.  Standing hamstring stretch: Put the heel of the leg on your injured side on a stool about 15 inches high. Keep your leg straight. Lean forward, bending at the hips, until you feel a mild stretch in the back of your thigh. Make sure you don't roll your shoulders or bend at the waist when doing this or you will stretch your lower back instead of your leg. Hold the stretch for 15 to 30 seconds. Repeat 3 times.  Resisted hip abduction: Stand sideways near a door with your injured side further from the door. Tie elastic tubing around the ankle on your injured side. Knot the other end of the tubing and close the knot in the door near the floor. Pull the tubing out to the side, keeping your leg straight. Return to the starting position. Do 2 sets of 15. For more resistance, move farther away from the door.  The plank: Lie on your stomach resting on our forearms. With your legs straight, lift your hips off the floor until they are in line with your shoulders. Support yourself on your forearms and toes. Hold this position for 15 seconds. (If this is too difficult, you can modify it by placing your knees on the floor.) Repeat 3 times. Work up to increasing your hold time to 30 to 60 seconds.  Side plank: Lie on your side with your legs, hips, and shoulders in a straight line. Prop yourself up onto your forearm with your elbow directly under your shoulder. Lift your hips off the floor and balance on your forearm and the outside of your foot. Try to hold this position for 15 seconds and then slowly lower your hip to  the ground. Switch sides and repeat. Work up to holding for 1 minute. This exercise can be made easier by starting with your knees and hips flexed toward your chest.  Prone hip extension with bent leg: Lie on your stomach with a pillow under your hips. Bend the knee on your injured side. Draw your belly button in towards your spine and tighten your abdominal muscles. Lift your bent leg off the floor about 6 inches (15 centimeters). Keep your other leg straight. Hold for 5 seconds. Then lower your leg and relax. Do 2 sets of 15.  Clam exercise: Lie on your uninjured side with your hips and knees bent and feet together. Slowly raise your top leg toward the ceiling while keeping your heels touching each other. Hold for 2 seconds and lower slowly. Do 2 sets of 15 repetitions.    WHAT IS PIRIFORMIS SYNDROME?    Piriformis syndrome is a problem that can happen when a muscle deep in the buttock presses on a nerve. This muscle is called the piriformis muscle and the nerve is called the sciatic nerve. You use the piriformis muscle to turn your thigh outward. The sciatic nerve passes through or next to the piriformis muscle as it travels from your back down into your leg. Pressure on the sciatic nerve can cause pain or tingling in the back of the hip and in the leg.    WHAT IS THE CAUSE?    Spasm, or tightening, of the piriformis muscle can put pressure on the nerve. This may happen, for example, if you have been doing a lot of downhill running, kicking, or sitting on hard surfaces.    WHAT ARE THE SYMPTOMS?    The main symptom is pain deep in your buttock. The pain usually goes down across your lower thigh. You may feel tingling or numbness in the back of your hip and leg. You may have more pain when you turn your thigh outward, like when you sit cross-legged.    HOW IS IT DIAGNOSED?    Your healthcare provider will ask about your symptoms, activities, and medical history and examine you. You may have X-rays or other scans  of your back.    HOW IS IT TREATED?    You will need to change or stop doing the activities that cause pain. Your healthcare provider may recommend stretching and strengthening exercises and other types of physical therapy to help you heal.    A mild injury may heal in a few weeks, but a severe injury may take 6 weeks or longer.    HOW CAN I HELP TAKE CARE OF MYSELF?    To help relieve swelling and pain:    Put an ice pack, gel pack, or package of frozen vegetables wrapped in a cloth on the area every 3 to 4 hours for up to 20 minutes at a time.  Take pain medicine, such as acetaminophen, ibuprofen, or other medicine as directed by your provider. Nonsteroidal anti-inflammatory medicines (NSAIDs), such as ibuprofen, may cause stomach bleeding and other problems. These risks increase with age. Read the label and take as directed. Unless recommended by your healthcare provider, do not take for more than 10 days.  Moist heat may help relax your muscles and make it easier to move your hip and leg. Put moist heat on the area for 10 to 15 minutes at a time before you do warm-up and stretching exercises. Moist heat includes heat patches or moist heating pads that you can purchase at most drugstores, a wet washcloth or towel that has been heated in the dryer, or a hot shower. Don t use heat if you have swelling.    Follow your healthcare provider's instructions, including any exercises recommended by your provider. Ask your provider:    How and when you will hear your test results  How long it will take to recover  What activities you should avoid and when you can return to your normal activities  How to take care of yourself at home  What symptoms or problems you should watch for and what to do if you have them  Make sure you know when you should come back for a checkup.    HOW CAN I HELP PREVENT PIRIFORMIS SYNDROME?    Warm-up exercises and stretching before activities can help prevent this problem.    Follow safety rules  and use any protective equipment recommended for your work or sport.    Developed by [employer].  Published by [employer].  Copyright  2014 [employer] and/or one of its subsidiaries. All rights reserved.

## 2018-11-20 NOTE — PROGRESS NOTES
November 20, 2018        Dear Dr. Khan:    I had the pleasure of meeting Esperanza Vidales in consultation today at the [facility] at your request.       History of Present Illness:    Ms. Vidales is a 23 year old female with recurrent left tonsillar abscesses over the last 12 months. She cannot think of an inciting event to these frequent episodes. Today she is asymptomatic. When she has had a PTA, it has been needle aspirated in the ED.     She is otherwise healthy. No history of bleeding or clotting disorders.     Lives with roommates. College student.     MEDICATIONS:     Current Outpatient Prescriptions   Medication Sig Dispense Refill     etonogestrel (NEXPLANON) 68 MG IMPL 1 each by Subdermal route once       fluticasone (FLONASE) 50 MCG/ACT spray Spray 2 sprays into both nostrils daily 1 Bottle 0     ibuprofen (ADVIL/MOTRIN) 800 MG tablet Take 1 tablet (800 mg) by mouth every 8 hours as needed for moderate pain 30 tablet 1     methylPREDNISolone (MEDROL DOSEPAK) 4 MG tablet Follow package instructions 21 tablet 0       ALLERGIES:    Allergies   Allergen Reactions     Latex Rash       HABITS/SOCIAL HISTORY:    Non-smoker  Drinks EtOH about once a week.   College student.     PAST MEDICAL HISTORY:   Past Medical History:   Diagnosis Date     Chronic tonsillitis september 2017        FAMILY HISTORY:  No family history of bleeding or clotting disorders.    REVIEW OF SYSTEMS:    A 10 point Review of Systems was performed and pertinent positives are noted in the HPI; remaining positives are:  Patient Supplied Answers to Review of Systems   ENT ROS 11/20/2018   Constitutional Appetite change   Neurology Dizzy spells, Headache   Ears, Nose, Throat Ear pain, Nasal congestion or drainage, Sore throat, Trouble swallowing   Cardiopulmonary Cough, Chest pain   Musculoskeletal Back pain       PHYSICAL EXAMINATION:    Constitutional:  The patient was unaccompanied,  well-groomed, and in no acute distress.    Skin:  Warm and pink.    Neurologic:  Alert and oriented x 3.  CN's III-XII within normal limits.  Voice normal.   Psychiatric:  The patient's affect was calm, cooperative, and appropriate.    Respiratory:  Breathing comfortably without stridor or exertion of accessory muscles.    Eyes: Pupils were equal and reactive.  Extraocular movement intact.    Head:  Normocephalic and atraumatic.  No lesions or scars.    Ears:  Pinnae and tragus non-tender.  EAC's and TM's were clear.     Nose:  Sinuses were non-tender.  Anterior rhinoscopy revealed midline septum and absence of purulence or polyps.    OC/OP:  Normal tongue, floor of mouth, buccal mucosa, and palate.  No lesions or masses on inspection or palpation.  No abnormal lymph tissue in the oropharynx.  The pterygoid region is non-tender.    HP/LX:  Mirror exam of the larynx reveals a sharp epiglottis and mobile arytenoids.  No pooling seen.  The cords themselves appear clear and mobile.   Neck:  Supple with normal laryngeal and tracheal landmarks.  The parotid beds were without masses.  No palpable thyroid.  Lymphatic:  There is no palpable lymphadenopathy in the neck.        IMPRESSION    1. Recurrent tonsillitis and peritonsillar abscess.       PLAN:  We would recommend to proceed with bilateral tonsillectomy given the severity and frequency of her tonsillitis/PTAs. We reviewed the risks,benefits, and alternatives to the procedure and answered her qeustions.     - she will schedule her procedure with our nurse coordinator. She has her actuarial exam on 1/13/2018. We would recommend waiting after the exam for surgery.   -salt water gargles  - if she develops pharyngitis and she is not improving in a couple of days, contact clinic to consider antibiotic therapy.   Thank you very much for the opportunity to participate in the care of your patient.         ADDENDUM:  I have spoken with and examined Ms. Vidales.  I was  present  for thevisit and all procedures and  based on my findings now I agree with the resident s findings and plan of care .  I have edited Dr. dooley's note to reflect my perspective on the findings, assessment, and plan.      Ilir Delgado MD, PhD  [phone]  Otolaryngology Department

## 2018-11-20 NOTE — LETTER
"2018       RE: Esperanza Vidales  [address] [facility] [address]     Dear Colleague,    Thank you for referring your patient, Esperanza Vidales, to the [facility] SPORTS AND ORTHOPAEDIC WALK IN CLINIC at [facility]. Please see a copy of my visit note below.    [facility]  Orthopedics  Eleno. Colette, DO  2018     Name: Esperanza Vidales  MRN: [id number]  Age: 23 year old  : 1995  Referring provider: Referred Self     Chief Complaint: Pain of the Lower Back       Date of Injury: 2018    History of Present Illness:   Esperanza Vidales is a 23 year old female who presents today for follow-up regarding left leg and buttocks pain. I last saw the patient on 18 for initial evaluation at which time he reported a history of left leg and buttock pain with sciatica that resolved last year, but has since returned. At that time, I recommended she follow-up after obtaining an MRI of her lumbar spine.    Today, she reports that she has improved since our last visit. 70% resolved at least. She notes that the medrol dose pack has helped alleviate her low back pain to the point where it is mostly gone. She has 2-3 more days of the medrol dose pack left. Has no other concerns today.     Review of Systems:   A 10-point review of systems was obtained and is negative except for as noted in the HPI.     Physical Examination:  Height 1.613 m (5' 3.5\"), weight 65.8 kg (145 lb), not currently breastfeeding.  General  - normal appearance, in no obvious distress  CV  - normal peripheral perfusion  Pulm  - normal respiratory pattern, non-labored  Musculoskeletal - lumbar spine  - stance: normal gait and stance  - inspection: normal bone and joint alignment, no obvious scoliosis  - palpation: No lumbar paraspinal tenderness. Central left gluteal tenderness to palpation in the region of left piriformis.   - ROM: No pain with extension. Flexion is normal. No " pain with left side bending and extension.   ROM of hip in external rotation and internal rotation does not provoke pain at pyriformis.  - strength: lower extremities 5/5 in all planes  - special tests:  (-) straight leg raise bilaterally, left side  (-) slump test, left side  Neuro  - patellar and Achilles DTRs 2+ bilaterally, no sensory or motor deficit, grossly normal coordination, normal muscle tone  Skin  - no ecchymosis, erythema, warmth, or induration, no obvious rash  Psych  - interactive, appropriate, normal mood and affect    Imaging:  MRI Lumbar spine w/o contrast (11/18/18)  Normal lumbar spine.    I have independently reviewed the above imaging studies; the results were discussed with the patient.     Assessment:   23 year old female with left sided low back pain with sciatica. MRI shows no lumbar spine abnormalities. 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 I will provide the patient with home exercises to stretch pyriformis  2. Complete medrol dosepak, 2 additional days.   3. Consider physical therapy if pain does not improve.  4. Follow-up as needed.     I, Rajat Alvarenga DO, have reviewed the above note and agree with the scribe's notation as written.    Scribe Disclosure:   I, Jeremiah Linn, am serving as a scribe to document services personally performed by Rajat Alvarenga DO at this visit, based upon the provider's statements to me. All documentation has been reviewed by the aforementioned provider prior to being entered into the official medical record.               SPORTS & ORTHOPEDIC WALK-IN FOLLOW-UP VISIT 11/20/2018    Interval History:     Follow up reason: L spine MRI results    Date of injury: No event    Date last seen: 10/16/18    Following Therapeutic Plan: Yes     Pain: Improving    Function: Unchanged    Interval History: Medrol dose pack seems to be helping with pain. Has  been doing HEP  With no issues.    Medical History:    Any recent changes to your medical history? No    Any new medication prescribed since last visit? No             Rajat Alvarenga, DO

## 2018-11-21 ENCOUNTER — TELEPHONE (OUTPATIENT)
Dept: OTOLARYNGOLOGY | Facility: CLINIC | Age: 23
End: 2018-11-21

## 2018-11-21 NOTE — TELEPHONE ENCOUNTER
Patient is scheduled for surgery with Dr. Delgado    Spoke or left message with: Patient    Date of Surgery: 12/17/18    Location: ASC    Informed patient they will need an adult  Yes    Pre-op with surgeon (if applicable): N/A    H&P: Scheduled with PCP - [facility]    Additional imaging/appointments: N/A    Surgery packet: Given to patient by nurse coordinator while in ENT Clinic    Additional comments: Postop 12/28/18  10:30AM

## 2018-12-12 ENCOUNTER — ANESTHESIA EVENT (OUTPATIENT)
Dept: SURGERY | Facility: AMBULATORY SURGERY CENTER | Age: 23
End: 2018-12-12

## 2018-12-12 ENCOUNTER — OFFICE VISIT (OUTPATIENT)
Dept: SURGERY | Facility: CLINIC | Age: 23
End: 2018-12-12
Payer: COMMERCIAL

## 2018-12-12 VITALS
DIASTOLIC BLOOD PRESSURE: 86 MMHG | SYSTOLIC BLOOD PRESSURE: 131 MMHG | HEART RATE: 86 BPM | BODY MASS INDEX: 25.18 KG/M2 | HEIGHT: 64 IN | TEMPERATURE: 97.6 F | OXYGEN SATURATION: 97 % | WEIGHT: 147.5 LBS

## 2018-12-12 DIAGNOSIS — Z01.818 PRE-OP EXAMINATION: Primary | ICD-10-CM

## 2018-12-12 ASSESSMENT — MIFFLIN-ST. JEOR: SCORE: 1411.12

## 2018-12-12 NOTE — H&P
Pre-Operative H & P     CC:  Preoperative exam to assess for increased cardiopulmonary risk while undergoing surgery and anesthesia.    Date of Encounter: 12/12/2018  Primary Care Physician:  No Ref-Primary, Physician  Reason: recurrent tonsillitis and peritonsillar abscess    HPI  Esperanza Vidales is a 23 year old female who presents for pre-operative H & P in preparation for bilateral tonsillectomy with Dr. Delgado on 12/17/18 at [facility] under [place].  Ms. Vidales saw Dr. Delgado on 11/20/18 for recurrent tonsillitis and peritonsillar abscess.  Through the evaluation, the above procedure was recommened.        Ms. Vidales denies any cardiopulmonary history or symptoms.  She has never had anesthesia and denies any known family history of complications from anesthesia.  She reports limited mouth opening 2/2 pain from tonsils. She would like to proceed.       History is obtained from the patient and electronic health record.     Past Medical History  Past Medical History:   Diagnosis Date     Chronic tonsillitis september 2017     Chronic tonsillitis        Past Surgical History  No past surgical history on file.    Hx of Blood transfusions/reactions: denies     Hx of abnormal bleeding or anti-platelet use: denies    Menstrual history: No LMP recorded. Patient has had an implant. Does not recall when she had her last menstruation as she has had an implant.     Steroid use in the last year: yes    Personal or FH with difficulty with Anesthesia:  denies    Prior to Admission Medications  Current Outpatient Medications   Medication Sig Dispense Refill     etonogestrel (NEXPLANON) 68 MG IMPL 1 each by Subdermal route once       ibuprofen (ADVIL/MOTRIN) 800 MG tablet Take 1 tablet (800 mg) by mouth every 8 hours as needed for moderate pain 30 tablet 1       Allergies  Allergies   Allergen Reactions     Latex Rash       Social History  Social History     Socioeconomic History     Marital status: Single     Spouse name:  "Not on file     Number of children: Not on file     Years of education: Not on file     Highest education level: Not on file   Social Needs     Financial resource strain: Not on file     Food insecurity - worry: Not on file     Food insecurity - inability: Not on file     Transportation needs - medical: Not on file     Transportation needs - non-medical: Not on file   Occupational History     Not on file   Tobacco Use     Smoking status: Never Smoker     Smokeless tobacco: Never Used   Substance and Sexual Activity     Alcohol use: Yes     Comment: Occasional     Drug use: No     Sexual activity: Yes     Partners: Male     Birth control/protection: Condom, Implant   Other Topics Concern     Not on file   Social History Narrative     Not on file       Family History  Family History   Problem Relation Age of Onset     Asthma Mother              ROS/MED HX    ENT/Pulmonary: Comment: Recurrent peritonsilar abscess needing to be drained three times.   - neg pulmonary ROS     Neurologic:  - neg neurologic ROS     Cardiovascular:  - neg cardiovascular ROS   (+) ----. : . . . :. . No previous cardiac testing       METS/Exercise Tolerance: Comment: Cardio and weight lifting. >4 METS   Hematologic:  - neg hematologic  ROS       Musculoskeletal:  - neg musculoskeletal ROS       GI/Hepatic:  - neg GI/hepatic ROS       Renal/Genitourinary:  - ROS Renal section negative       Endo:  - neg endo ROS       Psychiatric:  - neg psychiatric ROS       Infectious Disease:  - neg infectious disease ROS       Malignancy:      - no malignancy   Other:    (+) Possibly pregnant LMP: Arm Inplant, C-spine cleared: Yes, no H/O Chronic Pain,no other significant disability          Temp: 97.6  F (36.4  C) Temp src: Oral BP: 131/86 Pulse: 86     SpO2: 97 %         147 lbs 8 oz  5' 3.5\"   Body mass index is 25.72 kg/m .       Physical Exam  Constitutional: Awake, alert, cooperative, no apparent distress, and appears stated age.  Eyes: Pupils equal, " round and reactive to light, extra ocular muscles intact, sclera clear, conjunctiva normal.  HENT: Normocephalic, oral pharynx with moist mucus membranes, good dentition. No goiter appreciated. Limited mouth opening.   Respiratory: Clear to auscultation bilaterally, no crackles or wheezing.  Cardiovascular: Regular rate and rhythm, normal S1 and S2, and no murmur noted.  Carotids +2, no bruits. No edema. Palpable pulses to radial  DP and PT arteries.   GI: Normal bowel sounds, soft, non-distended, non-tender, no masses palpated, no hepatosplenomegaly.    Lymph/Hematologic: No cervical lymphadenopathy and no supraclavicular lymphadenopathy.  Genitourinary:  na  Skin: Warm and dry.    Musculoskeletal: Full ROM of neck. There is no redness, warmth, or swelling of the joints. Gross motor strength is normal.    Neurologic: Awake, alert, oriented to name, place and time. Cranial nerves II-XII are grossly intact. Gait is normal.   Neuropsychiatric: Calm, cooperative. Normal affect.     Labs: (personally reviewed)    Lab Results   Component Value Date    WBC 9.9 11/07/2018    HGB 13.5 11/07/2018    HCT 39.8 11/07/2018     11/07/2018       ASSESSMENT and PLAN  Esperanza Vidales is a 23 year old female scheduled to undergo  bilateral tonsillectomy with Dr. Delgado on 12/17/18 at [facility] under [place].      She has the following specific operative considerations:   - METS:  >4. RCRI : No serious cardiac risks.  0.4 % risk of major adverse cardiac event.  No further cardiac evaluation needed per 2014 ACC/AHA guidelines for non-cardiac surgery.   - RIYA risks:  low  - VTE risk:  0.26%  - Risk of PONV score = 2.  If > 2, anti-emetic intervention recommended.    1.  Cardiology  - denies cardiac history or symptoms     2.  Pulmonary - no smoking hx  3.  HEENT - recurrent tonsillitis and peritonsillar abscess>>>above procedure planned    - Anesthesia considerations:  Refer to PAC assessment in anesthesia records  - Airway:   Limited mouth opening 2/2 pain from tonsils.    Arrival time, NPO, shower and medication instructions provided by nursing staff today.  Preparing For Your Surgery handout given.  Patient was discussed with Dr Garcia. I spent 30 minutes face to face with patient assessing, educating, counseling and/or coordinating care and examining the patient.  Of that 30 minutes, I spent greater than 50% of my time counseling and/or coordinating care.      LETICIA Crowder CNP  Preoperative Assessment Center  [facility]  Clinic and Surgery Center  Phone: [phone]  Fax: [phone]

## 2018-12-12 NOTE — ANESTHESIA PREPROCEDURE EVALUATION
Anesthesia Pre-Procedure Evaluation    Patient: Esperanza Vidales   MRN:     [id number] Gender:   female   Age:    23 year old :      1995        Preoperative Diagnosis: Tonsillar Abscess   Procedure(s):  Bilateral Tonsillectomy     Past Medical History:   Diagnosis Date     Chronic tonsillitis 2017      No past surgical history on file.       Anesthesia Evaluation     . Pt has not had prior anesthetic            ROS/MED HX    ENT/Pulmonary: Comment: Recurrent peritonsilar abscess needing to be drained three times.   - neg pulmonary ROS     Neurologic:  - neg neurologic ROS     Cardiovascular:  - neg cardiovascular ROS   (+) ----. : . . . :. . No previous cardiac testing       METS/Exercise Tolerance: Comment: Cardio and weight lifting. >4 METS   Hematologic:  - neg hematologic  ROS       Musculoskeletal:  - neg musculoskeletal ROS       GI/Hepatic:  - neg GI/hepatic ROS       Renal/Genitourinary:  - ROS Renal section negative       Endo:  - neg endo ROS       Psychiatric:  - neg psychiatric ROS       Infectious Disease:  - neg infectious disease ROS       Malignancy:      - no malignancy   Other:    (+) Possibly pregnant LMP: Arm Inplant, C-spine cleared: Yes, no H/O Chronic Pain,no other significant disability                        PHYSICAL EXAM:   Mental Status/Neuro: A/A/O   Airway: Facies: Feasible  Mallampati: II  Mouth/Opening: Limited  TM distance: > 6 cm  Neck ROM: Full   Respiratory: Auscultation: CTAB     Resp. Rate: Normal     Resp. Effort: Normal      CV: Rhythm: Regular  Rate: Age appropriate  Heart: Normal Sounds   Comments:      Dental: Normal                  Lab Results   Component Value Date    WBC 9.9 2018    HGB 13.5 2018    HCT 39.8 2018     2018       Preop Vitals  BP Readings from Last 3 Encounters:   18 131/86   18 126/77   18 118/84    Pulse Readings from Last 3 Encounters:   18 86   18 101   18 77     "  Resp Readings from Last 3 Encounters:   11/07/18 16   10/16/18 18   10/15/18 16    SpO2 Readings from Last 3 Encounters:   12/12/18 97%   11/07/18 99%   10/16/18 98%      Temp Readings from Last 1 Encounters:   12/12/18 97.6  F (36.4  C) (Oral)    Ht Readings from Last 1 Encounters:   12/12/18 1.613 m (5' 3.5\")      Wt Readings from Last 1 Encounters:   12/12/18 66.9 kg (147 lb 8 oz)    Estimated body mass index is 25.72 kg/m  as calculated from the following:    Height as of this encounter: 1.613 m (5' 3.5\").    Weight as of this encounter: 66.9 kg (147 lb 8 oz).     LDA:            Assessment:   ASA SCORE: 1    NPO Status: > 6 hours since completed Solid Foods   Documentation: H&P complete; Preop Testing complete; Consents complete   Proceeding: Proceed without further delay  Tobacco Use:  NO Active use of Tobacco/UNKNOWN Tobacco use status     Plan:   Anes. Type:  General   Pre-Induction: Midazolam IV; Acetaminophen PO   Induction:  IV (Standard)   Airway: Oral ETT   Access/Monitoring: PIV   Maintenance: Balanced   Emergence: Procedure Site   Logistics: Same Day Surgery     Postop Pain/Sedation Strategy:  Standard-Options: Opioids PRN     PONV Management:  Adult Risk Factors: Female, Non-Smoker, Postop Opioids  Prevention: Ondansetron; Dexamethasone     CONSENT: Direct conversation   Plan and risks discussed with: Patient          Comments for Plan/Consent:  Patient seen and examined  No changes to medical history from previous pre-operative clinic note  Rubin White MD on 12/17/2018 at 7:08 AM                    PAC Discussion and Assessment    ASA Classification: 1  Case is suitable for: ASC  Anesthetic techniques and relevant risks discussed: [place]  Invasive monitoring and risk discussed:   Types:   Possibility and Risk of blood transfusion discussed:   NPO instructions given:   Additional anesthetic preparation and risks discussed:   Needs early admission to pre-op area:   Other:     PAC Resident/NP " Anesthesia Assessment:  Esperanza Vidales is a 23 year old female scheduled for bilateral tonsillectomy with Dr. Delgado on 12/17/18 at [facility] under [place].  Ms. Vidales saw Dr. Delgado on 11/20/18 for recurrent tonsillitis and peritonsillar abscess.  Through the evaluation, the above procedure was recommened.        Ms. Vidales denies any cardiopulmonary history or symptoms.  She has never had anesthesia and denies any known family history of complications from anesthesia.  She reports limited mouth opening 2/2 pain from tonsils. She would like to proceed.    She has the following specific operative considerations:   - METS:  >4. RCRI : No serious cardiac risks.  0.4 % risk of major adverse cardiac event.  No further cardiac evaluation needed per 2014 ACC/AHA guidelines for non-cardiac surgery.   - RIYA risks:  low  - VTE risk:  0.26%  - Risk of PONV score = 2.  If > 2, anti-emetic intervention recommended.    1.  Cardiology  - denies cardiac history or symptoms     2.  Pulmonary - no smoking hx  3.  HEENT - recurrent tonsillitis and peritonsillar abscess>>>above procedure planned    - Anesthesia considerations:  Refer to PAC assessment in anesthesia records  - Airway:  Limited mouth opening 2/2 pain from tonsils.    Arrival time, NPO, shower and medication instructions provided by nursing staff today.  Preparing For Your Surgery handout given.  Patient was discussed with Dr Garcia. I spent 30 minutes face to face with patient assessing, educating, counseling and/or coordinating care and examining the patient.  Of that 30 minutes, I spent greater than 50% of my time counseling and/or coordinating care.        Reviewed and Signed by PAC Mid-Level Provider/Resident  Mid-Level Provider/Resident: Floridalma KELLY CNP  Date: 12/12/18  Time: 11:20    Attending Anesthesiologist Anesthesia Assessment:  23 year old for tonsillectomy in management of recurrent tonsillitis.    Patient/case discussed with FAUSTO/resident; agree  with above assessment. No need to see patient. Patient is appropriate for the planned procedure without further work-up or medical management.      Reviewed and Signed by PAC Anesthesiologist  Anesthesiologist: scott  Date: 12/12/2018  Time:   Pass/Fail: Pass  Disposition:     PAC Pharmacist Assessment:        Pharmacist:   Date:   Time:        LETICIA Crowder CNP

## 2018-12-12 NOTE — PATIENT INSTRUCTIONS
Preparing for Your Surgery      Name:  Esperanza Vidales   MRN:  [id number]   :  1995   Today's Date:  2018     Arriving for surgery:  Surgery date:  18  Arrival time:  5:45AM  Please come to:     [facility]  [address] [place] [id number]     Parking is available in front of the Clinics and Surgery Center building from 5:30AM to 8:00PM.  -  Proceed to the 5th floor to check into the Ambulatory Surgery Center.              >> There will be patient concierges on the 1st and 5th floor, for assistance or an escort, if you would like.              >> Please call [phone] with any questions.    What can I eat or drink?  -  You may have solid food or milk products until 8 hours prior to your surgery. 18, 11:15PM  -  You may have water, apple juice or 7up/Sprite until 2 hours prior to your surgery. 18, 5:15AM    Which medicines can I take?  Stop Aspirin, vitamins and supplements one week prior to surgery.  Hold Ibuprofen and Naproxen for 24 hours prior to surgery.     How do I prepare myself?  -  Take two showers: one the night before surgery; and one the morning of surgery.         Use Scrubcare or Hibiclens to wash from neck down.  You may use your own shampoo and conditioner. No other hair products.   -  Do NOT use lotion, powder, deodorant, or antiperspirant the day of your surgery.  -  Do NOT wear any makeup, fingernail polish or jewelry.  - Do not bring your own medications to the hospital, except for inhalers and eye drops.  -  Bring your ID and insurance card.    Questions or Concerns:  -If you are scheduled at the Ambulatory Surgery Center please call [phone].    -For questions after surgery please call your surgeons office.

## 2018-12-17 ENCOUNTER — ANESTHESIA (OUTPATIENT)
Dept: SURGERY | Facility: AMBULATORY SURGERY CENTER | Age: 23
End: 2018-12-17

## 2018-12-17 ENCOUNTER — SURGERY (OUTPATIENT)
Age: 23
End: 2018-12-17
Payer: COMMERCIAL

## 2018-12-17 ENCOUNTER — HOSPITAL ENCOUNTER (OUTPATIENT)
Facility: AMBULATORY SURGERY CENTER | Age: 23
End: 2018-12-17
Attending: OTOLARYNGOLOGY
Payer: COMMERCIAL

## 2018-12-17 VITALS
TEMPERATURE: 97.8 F | HEART RATE: 83 BPM | OXYGEN SATURATION: 97 % | DIASTOLIC BLOOD PRESSURE: 92 MMHG | RESPIRATION RATE: 16 BRPM | SYSTOLIC BLOOD PRESSURE: 138 MMHG | WEIGHT: 147 LBS | BODY MASS INDEX: 25.1 KG/M2 | HEIGHT: 64 IN

## 2018-12-17 DIAGNOSIS — J03.90 TONSILLITIS: Primary | ICD-10-CM

## 2018-12-17 LAB
HCG UR QL: NEGATIVE
INTERNAL QC OK POCT: YES

## 2018-12-17 RX ORDER — OXYCODONE HCL 5 MG/5 ML
5 SOLUTION, ORAL ORAL ONCE
Status: COMPLETED | OUTPATIENT
Start: 2018-12-17 | End: 2018-12-17

## 2018-12-17 RX ORDER — NALOXONE HYDROCHLORIDE 0.4 MG/ML
.1-.4 INJECTION, SOLUTION INTRAMUSCULAR; INTRAVENOUS; SUBCUTANEOUS
Status: DISCONTINUED | OUTPATIENT
Start: 2018-12-17 | End: 2018-12-18 | Stop reason: HOSPADM

## 2018-12-17 RX ORDER — FENTANYL CITRATE 50 UG/ML
25-50 INJECTION, SOLUTION INTRAMUSCULAR; INTRAVENOUS EVERY 5 MIN PRN
Status: DISCONTINUED | OUTPATIENT
Start: 2018-12-17 | End: 2018-12-17 | Stop reason: HOSPADM

## 2018-12-17 RX ORDER — LIDOCAINE 40 MG/G
CREAM TOPICAL
Status: DISCONTINUED | OUTPATIENT
Start: 2018-12-17 | End: 2018-12-17 | Stop reason: HOSPADM

## 2018-12-17 RX ORDER — ACETAMINOPHEN 325 MG/1
975 TABLET ORAL ONCE
Status: COMPLETED | OUTPATIENT
Start: 2018-12-17 | End: 2018-12-17

## 2018-12-17 RX ORDER — GLYCOPYRROLATE 0.2 MG/ML
INJECTION, SOLUTION INTRAMUSCULAR; INTRAVENOUS PRN
Status: DISCONTINUED | OUTPATIENT
Start: 2018-12-17 | End: 2018-12-17

## 2018-12-17 RX ORDER — LIDOCAINE HYDROCHLORIDE 20 MG/ML
INJECTION, SOLUTION INFILTRATION; PERINEURAL PRN
Status: DISCONTINUED | OUTPATIENT
Start: 2018-12-17 | End: 2018-12-17

## 2018-12-17 RX ORDER — ONDANSETRON 2 MG/ML
INJECTION INTRAMUSCULAR; INTRAVENOUS PRN
Status: DISCONTINUED | OUTPATIENT
Start: 2018-12-17 | End: 2018-12-17

## 2018-12-17 RX ORDER — PROPOFOL 10 MG/ML
INJECTION, EMULSION INTRAVENOUS PRN
Status: DISCONTINUED | OUTPATIENT
Start: 2018-12-17 | End: 2018-12-17

## 2018-12-17 RX ORDER — ONDANSETRON 2 MG/ML
4 INJECTION INTRAMUSCULAR; INTRAVENOUS EVERY 30 MIN PRN
Status: DISCONTINUED | OUTPATIENT
Start: 2018-12-17 | End: 2018-12-18 | Stop reason: HOSPADM

## 2018-12-17 RX ORDER — BUPIVACAINE HYDROCHLORIDE 2.5 MG/ML
INJECTION, SOLUTION INFILTRATION; PERINEURAL PRN
Status: DISCONTINUED | OUTPATIENT
Start: 2018-12-17 | End: 2018-12-17 | Stop reason: HOSPADM

## 2018-12-17 RX ORDER — FENTANYL CITRATE 50 UG/ML
INJECTION, SOLUTION INTRAMUSCULAR; INTRAVENOUS PRN
Status: DISCONTINUED | OUTPATIENT
Start: 2018-12-17 | End: 2018-12-17

## 2018-12-17 RX ORDER — PROPOFOL 10 MG/ML
INJECTION, EMULSION INTRAVENOUS CONTINUOUS PRN
Status: DISCONTINUED | OUTPATIENT
Start: 2018-12-17 | End: 2018-12-17

## 2018-12-17 RX ORDER — GABAPENTIN 300 MG/1
300 CAPSULE ORAL ONCE
Status: COMPLETED | OUTPATIENT
Start: 2018-12-17 | End: 2018-12-17

## 2018-12-17 RX ORDER — DEXAMETHASONE SODIUM PHOSPHATE 10 MG/ML
INJECTION, SOLUTION INTRAMUSCULAR; INTRAVENOUS PRN
Status: DISCONTINUED | OUTPATIENT
Start: 2018-12-17 | End: 2018-12-17

## 2018-12-17 RX ORDER — ACETAMINOPHEN 160 MG/5ML
650 SUSPENSION ORAL EVERY 6 HOURS PRN
Qty: 300 ML | Refills: 1 | Status: SHIPPED | OUTPATIENT
Start: 2018-12-17 | End: 2018-12-28

## 2018-12-17 RX ORDER — HYDROMORPHONE HYDROCHLORIDE 1 MG/ML
.3-.5 INJECTION, SOLUTION INTRAMUSCULAR; INTRAVENOUS; SUBCUTANEOUS EVERY 10 MIN PRN
Status: DISCONTINUED | OUTPATIENT
Start: 2018-12-17 | End: 2018-12-17 | Stop reason: HOSPADM

## 2018-12-17 RX ORDER — ONDANSETRON 4 MG/1
4 TABLET, ORALLY DISINTEGRATING ORAL EVERY 30 MIN PRN
Status: DISCONTINUED | OUTPATIENT
Start: 2018-12-17 | End: 2018-12-18 | Stop reason: HOSPADM

## 2018-12-17 RX ORDER — SODIUM CHLORIDE, SODIUM LACTATE, POTASSIUM CHLORIDE, CALCIUM CHLORIDE 600; 310; 30; 20 MG/100ML; MG/100ML; MG/100ML; MG/100ML
INJECTION, SOLUTION INTRAVENOUS CONTINUOUS
Status: DISCONTINUED | OUTPATIENT
Start: 2018-12-17 | End: 2018-12-18 | Stop reason: HOSPADM

## 2018-12-17 RX ORDER — SODIUM CHLORIDE, SODIUM LACTATE, POTASSIUM CHLORIDE, CALCIUM CHLORIDE 600; 310; 30; 20 MG/100ML; MG/100ML; MG/100ML; MG/100ML
INJECTION, SOLUTION INTRAVENOUS CONTINUOUS
Status: DISCONTINUED | OUTPATIENT
Start: 2018-12-17 | End: 2018-12-17 | Stop reason: HOSPADM

## 2018-12-17 RX ORDER — OXYCODONE HCL 5 MG/5 ML
5-10 SOLUTION, ORAL ORAL EVERY 6 HOURS PRN
Qty: 400 ML | Refills: 0 | Status: SHIPPED | OUTPATIENT
Start: 2018-12-17 | End: 2018-12-20

## 2018-12-17 RX ADMIN — PROPOFOL 150 MCG/KG/MIN: 10 INJECTION, EMULSION INTRAVENOUS at 07:32

## 2018-12-17 RX ADMIN — Medication 40 MG: at 07:28

## 2018-12-17 RX ADMIN — SODIUM CHLORIDE, SODIUM LACTATE, POTASSIUM CHLORIDE, CALCIUM CHLORIDE: 600; 310; 30; 20 INJECTION, SOLUTION INTRAVENOUS at 07:14

## 2018-12-17 RX ADMIN — GABAPENTIN 300 MG: 300 CAPSULE ORAL at 06:55

## 2018-12-17 RX ADMIN — FENTANYL CITRATE 50 MCG: 50 INJECTION, SOLUTION INTRAMUSCULAR; INTRAVENOUS at 07:28

## 2018-12-17 RX ADMIN — ONDANSETRON 4 MG: 2 INJECTION INTRAMUSCULAR; INTRAVENOUS at 07:28

## 2018-12-17 RX ADMIN — FENTANYL CITRATE 50 MCG: 50 INJECTION, SOLUTION INTRAMUSCULAR; INTRAVENOUS at 07:25

## 2018-12-17 RX ADMIN — GLYCOPYRROLATE 0.2 MG: 0.2 INJECTION, SOLUTION INTRAMUSCULAR; INTRAVENOUS at 08:12

## 2018-12-17 RX ADMIN — LIDOCAINE HYDROCHLORIDE 60 MG: 20 INJECTION, SOLUTION INFILTRATION; PERINEURAL at 07:28

## 2018-12-17 RX ADMIN — ACETAMINOPHEN 975 MG: 325 TABLET ORAL at 06:54

## 2018-12-17 RX ADMIN — DEXAMETHASONE SODIUM PHOSPHATE 10 MG: 10 INJECTION, SOLUTION INTRAMUSCULAR; INTRAVENOUS at 07:28

## 2018-12-17 RX ADMIN — PROPOFOL 200 MG: 10 INJECTION, EMULSION INTRAVENOUS at 07:28

## 2018-12-17 RX ADMIN — Medication 5 MG: at 08:29

## 2018-12-17 ASSESSMENT — MIFFLIN-ST. JEOR: SCORE: 1408.85

## 2018-12-17 NOTE — DISCHARGE INSTRUCTIONS
"[facility]  Home Care Following Anesthesia  For 24 hours after surgery:  1. Get plenty of rest.  A responsible adult must stay with you for at least 24 hours after you leave the surgery center.  2. Do not drive or use heavy equipment.  If you have weakness or tingling, don't drive or use heavy equipment until this feeling goes away.   3. Do not drink alcohol.   4. Avoid strenuous or risky activities.  Ask for help when climbing stairs.  5. You may feel lightheaded.  IF so, sit for a few minutes before standing.  Have someone help you get up.   6. If you have nausea (feel sick to your stomach): Drink only clear liquids such as apple juice, ginger ale, broth or 7-Up.  Rest may also help.  Be sure to drink enough fluids.  Move to a regular diet as you feel able.   7. You may have a slight fever.  Call the doctor if your fever is over 100 F (37.7 C) (taken under the tongue) or lasts longer than 24 hours.  8. You may have a dry mouth, a sore throat, muscle aches or trouble sleeping. These should go away after 24 hours.  9. Do not make important or legal decisions.   If you use hormonal birth control (such as the pill, patch, ring or implants):  You will need a second form of birth control for 7 days (condoms, a diaphragm or contraceptive foam).  While in the surgery center, you received a medicine called Sugammadex.  Hormonal birth control (such as the pill, patch, ring or implants) will not work as well for a week after taking this medicine.  Today you received a Marcaine or bupivacaine block to numb the nerves near your surgery site.  This is a block using local anesthetic or \"numbing\" medication injected around the nerves to anesthetize or \"numb\" the area supplied by those nerves.  This block is injected into the muscle layer near your surgical site.  The medication may numb the location where you had surgery for 6-18 hours, but may last up to 24 hours.  If your surgical site is " an arm or leg you should be careful with your affected limb, since it is possible to injure your limb without being aware of it due to the numbing.  Until full feeling returns, you should guard against bumping or hitting your limb, and avoid extreme hot or cold temperatures on the skin.  As the block wears off, the feeling will return as a tingling or prickly sensation near your surgical site.  You will experience more discomfort from your incision as the feeling returns.  You may want to take a pain pill (a narcotic or Tylenol if this was prescribed by your surgeon) when you start to experience mild pain before the pain beccomes more severe.  If your pain medications do not control your pain you should notifiy your surgeon.    Tips for taking pain medications  To get the best pain relief possible, remember these points:    Take pain medications as directed, before pain becomes severe.    Pain medication can upset your stomach: taking it with food may help.    Constipation is a common side effect of pain medication. Drink plenty of  fluids.    Eat foods high in fiber. Take a stool softener if recommended by your doctor or pharmacist.    Do not drink alcohol, drive or operate machinery while taking pain medications.    Ask about other ways to control pain, such as with heat, ice or relaxation.    Tylenol/Acetaminophen Consumption  To help encourage the safe use of acetaminophen, the makers of TYLENOL  have lowered the maximum daily dose for single-ingredient Extra Strength TYLENOL  (acetaminophen) products sold in the [place] from 8 pills per day (4,000 mg) to 6 pills per day (3,000 mg). The dosing interval has also changed from 2 pills every 4-6 hours to 2 pills every 6 hours.    If you feel your pain relief is insufficient, you may take Tylenol/Acetaminophen in addition to your narcotic pain medication.     Be careful not to exceed 3,000 mg of Tylenol/Acetaminophen in a 24 hour period from all sources.    If you are  taking extra strength Tylenol/acetaminophen (500 mg), the maximum dose is 6 tablets in 24 hours.    If you are taking regular strength acetaminophen (325 mg), the maximum dose is 9 tablets in 24 hours.    Call a doctor for any of the followin. Signs of infection (fever, growing tenderness at the surgery site, a large amount of drainage or bleeding, severe pain, foul-smelling drainage, redness, swelling).  2. It has been over 8 to 10 hours since surgery and you are still not able to urinate (pass water).  3. Headache for over 24 hours.  4. Numbness, tingling or weakness the day after surgery (if you had spinal anesthesia).  Your doctor is:       Dr. Ilir Delgado, ENT Otolaryngology: [phone]               Or dial [phone] and ask for the resident on call for:  ENT Otolaryngology  For emergency care, call the:  [facility]:  [phone] (TTY for hearing impaired: [phone])

## 2018-12-17 NOTE — ANESTHESIA POSTPROCEDURE EVALUATION
Anesthesia POST Procedure Evaluation    Patient: Esperanza Vidales   MRN:     [id number] Gender:   female   Age:    23 year old :      1995        Preoperative Diagnosis: Tonsillar Abscess   Procedure(s):  Bilateral Tonsillectomy   Postop Comments: No value filed.       Anesthesia Type:  General    Reportable Event: NO     PAIN: Uncomplicated   Sign Out status: Comfortable, Well controlled pain     PONV: No PONV   Sign Out status:  No Nausea or Vomiting     Neuro/Psych: Uneventful perioperative course   Sign Out Status: Preoperative baseline; Age appropriate mentation     Airway/Resp.: Uneventful perioperative course   Sign Out Status: Non labored breathing, age appropriate RR; Resp. Status within EXPECTED Parameters     CV: Uneventful perioperative course   Sign Out status: Appropriate BP and perfusion indices; Appropriate HR/Rhythm     Disposition:   Sign Out in:  PACU  Disposition:  Phase II; Home  Recovery Course: Uneventful  Follow-Up: Not required           Last Anesthesia Record Vitals:  CRNA VITALS  2018 0747 - 2018 0847      2018             Pulse:  114    SpO2:  96 %    Resp Rate (observed):  2  (Abnormal)     Resp Rate (set):  7  (Abnormal)           Last PACU/Preop Vitals:  Vitals:    18 0840 18 0852 18 0903   BP: (!) 135/93 131/86 (!) 138/92   Pulse:      Resp: 16 16 16   Temp:   36.6  C (97.8  F)   SpO2: 98%  97%         Electronically Signed By: Rubin White MD, 2018, 12:05 PM

## 2018-12-17 NOTE — OP NOTE
Otolaryngology Operative Report   Date of Operation: December 17, 2018    Staff Surgeon: Ilir Delgado MD    Resident(s): Crystal Garcia MD    Preoperative Diagnosis:   1. Recurrent strep throat    Postoperative Diagnosis: Same    Procedure:   1. Bilateral tonsillectomy    Clinical Indication: recurrent strep throat, adenotonsillar hypertrophy    Anesthesia: GETA    Findings: Bilateral 2+ tonsils; L more cryptic than than the R     EBL: 5 mL    Complications: None    Drains/Tubes/Implants: None    Specimens: Bilateral tonsils    Disposition: Stable to the PACU    Description of Procedure:   The patient was brought to the operating room and placed in supine position on the operating table. The patient was orotracheally intubated under general anesthesia without difficulty. The head was placed into extension with a shoulder roll and the patient was draped in the usual sterile fashion. A timeout was performed to confirm the patient's identity and procedures being performed.  A McIvor mouth gag was then placed into the oral cavity and put into suspension.  A complete bilateral palatine tonsillectomy was performed by dissecting each tonsil away from the tonsillar fossa along its capsule using monopolar electrocautery. Hemostasis was achieved with suction bovie. The gag was let down for a few moments to allow blood return and the tonsillar fossae were again inspected for evidence and control of bleeding with suction cautery. Two bethanie savvas were performed and again hemostasis was confirmed. The gag was removed. The patient was then extubated and taken to the PACU in satisfactory and stable condition. The patient tolerated the procedure and anesthesia without difficulty.   Dr. Delgado was present for the procedure    Crystal Garcia MD

## 2018-12-17 NOTE — ANESTHESIA CARE TRANSFER NOTE
Patient: Esperanza Vidales    Procedure(s):  Bilateral Tonsillectomy    Diagnosis: Tonsillar Abscess  Diagnosis Additional Information: No value filed.    Anesthesia Type:   No value filed.     Note:  Airway :Room Air  Patient transferred to:PACU  Comments: VSS/WNL. Responds well.Handoff Report: Identifed the Patient, Identified the Reponsible Provider, Reviewed the pertinent medical history, Discussed the surgical course, Reviewed Intra-OP anesthesia mangement and issues during anesthesia, Set expectations for post-procedure period and Allowed opportunity for questions and acknowledgement of understanding      Vitals: (Last set prior to Anesthesia Care Transfer)    CRNA VITALS  12/17/2018 0747 - 12/17/2018 0820      12/17/2018             Pulse:  114    SpO2:  96 %    Resp Rate (observed):  2  (Abnormal)     Resp Rate (set):  7  (Abnormal)                 Electronically Signed By: LETICIA Viramontes CRNA  December 17, 2018  8:20 AM

## 2018-12-18 ENCOUNTER — TELEPHONE (OUTPATIENT)
Dept: OTOLARYNGOLOGY | Facility: CLINIC | Age: 23
End: 2018-12-18

## 2018-12-18 LAB — COPATH REPORT: NORMAL

## 2018-12-18 NOTE — TELEPHONE ENCOUNTER
[facility]    Phone Message    May a detailed message be left on voicemail: yes    Reason for Call: Form or Letter   Type or form/letter needing completion: Medical Supplement Form  Provider: Sandy FOSTER   Date form needed: ASAP  Once completed: Patient will  at .   Comments:  Per pt's Mom she will fax the form and will  once completed.     Action Taken: Message routed to:  [facility] ENT

## 2018-12-19 NOTE — TELEPHONE ENCOUNTER
[facility]    Phone Message    May a detailed message be left on voicemail: yes    Reason for Call: Other: PT's Mom:  Calling regarding Medical Supplement form for pt, Per pt's Mom needing form ASAP. Please call pt on Mobile phone to give update if the form has been received or reviewed by Dr. Delgado. Form can be signed by any medical professional not just Dr. Delgado per pt's Mom. Please advise and follow up pt's Mom when available thank you. -FYI They will be faxing it again incase it didn't come through     Action Taken: Message routed to:  [facility] ENT

## 2018-12-20 NOTE — TELEPHONE ENCOUNTER
Call placed to patient and mother informing them that the Medical Supplement form has been completed.  Verified fax number and form was sent.  My direct phone number was given if there are any questions with the form.  Patient's mom states she is doing well after surgery with less pain than prior abscesses caused, denies fever or complications.      Matthias Milligan RN

## 2018-12-28 ENCOUNTER — OFFICE VISIT (OUTPATIENT)
Dept: OTOLARYNGOLOGY | Facility: CLINIC | Age: 23
End: 2018-12-28
Payer: COMMERCIAL

## 2018-12-28 VITALS
SYSTOLIC BLOOD PRESSURE: 117 MMHG | BODY MASS INDEX: 25.16 KG/M2 | HEART RATE: 81 BPM | DIASTOLIC BLOOD PRESSURE: 73 MMHG | HEIGHT: 63 IN | WEIGHT: 142 LBS

## 2018-12-28 DIAGNOSIS — J36 TONSILLAR ABSCESS: Primary | ICD-10-CM

## 2018-12-28 ASSESSMENT — PAIN SCALES - GENERAL: PAINLEVEL: NO PAIN (0)

## 2018-12-28 ASSESSMENT — MIFFLIN-ST. JEOR: SCORE: 1378.11

## 2018-12-28 NOTE — NURSING NOTE
"Chief Complaint   Patient presents with     RECHECK     post op - tonsillectomy      Blood pressure 117/73, pulse 81, height 1.6 m (5' 2.99\"), weight 64.4 kg (142 lb), not currently breastfeeding.      Nabil Yusuf LPN    "

## 2018-12-28 NOTE — PATIENT INSTRUCTIONS
Thank you for choosing  [facility].  Please follow up with Dr. Delgado as needed.    [phone] appointment scheduling option 1 and nurse advice option 3.

## 2018-12-28 NOTE — PROGRESS NOTES
HISTORY OF PRESENT ILLNESS:  Esperanza Vidales is 23 years of age.  She had her tonsils out 11 days ago.  She is leaving town soon to go home.  She is here with her mother today.  She has no other new complaints at the present today.  There was actinomyces found on the tonsils.  She is eating and drinking finally to a reasonable extent.  She has had no bleeding.      PHYSICAL EXAMINATION:  The patient is alert, oriented x3 and pleasant.  Skin on the face, lips and neck is normal.  No adenopathy is present in her neck that is significant.  Oral cavity and oropharynx shows tonsillar beds are not quite healed yet, but there is nothing that looks like it has been bleeding.  The scabs are off.        ASSESSMENT/PLAN:  Patient status post tonsillectomy.  She is doing well presently.  We will see her again on an as-needed basis.

## 2018-12-28 NOTE — LETTER
12/28/2018       RE: Esperanza Vidales  [address]  [place] [address]     Dear Colleague,    Thank you for referring your patient, Esperanza Vidales, to the [facility] at [facility]. Please see a copy of my visit note below.    HISTORY OF PRESENT ILLNESS:  Esperanza Vidales is 23 years of age.  She had her tonsils out 11 days ago.  She is leaving town soon to go home.  She is here with her mother today.  She has no other new complaints at the present today.  There was actinomyces found on the tonsils.  She is eating and drinking finally to a reasonable extent.  She has had no bleeding.      PHYSICAL EXAMINATION:  The patient is alert, oriented x3 and pleasant.  Skin on the face, lips and neck is normal.  No adenopathy is present in her neck that is significant.  Oral cavity and oropharynx shows tonsillar beds are not quite healed yet, but there is nothing that looks like it has been bleeding.  The scabs are off.        ASSESSMENT/PLAN:  Patient status post tonsillectomy.  She is doing well presently.  We will see her again on an as-needed basis.         Again, thank you for allowing me to participate in the care of your patient.      Sincerely,    Ilir Delgado MD

## 2019-02-15 ENCOUNTER — HEALTH MAINTENANCE LETTER (OUTPATIENT)
Age: 24
End: 2019-02-15

## 2019-07-29 ENCOUNTER — TELEPHONE (OUTPATIENT)
Dept: ORTHOPEDICS | Facility: CLINIC | Age: 24
End: 2019-07-29

## 2019-07-29 NOTE — TELEPHONE ENCOUNTER
[facility]    Phone Message    May a detailed message be left on voicemail: yes    Reason for Call: Other: Patient's mom called in and patient  was seen in Nov 2018 for leg and buttocks pain. She is having the same pain again and is wondering if she needs to be seen for this again or is there something that  can prescribe to her instead of her having to come in. She is scheduled for a appointment on 08/13 but please follow up with the patient asap to discuss. Thank you.     Action Taken: Message routed to:  [facility] ortho

## 2019-07-30 NOTE — TELEPHONE ENCOUNTER
ALMAZM for Olena letting her know she can be seen sooner than 8/13 if she would like. Left her the Walk-In phone number to call us back.     - Ap LARA ATC

## 2019-08-01 ENCOUNTER — TELEPHONE (OUTPATIENT)
Dept: ORTHOPEDICS | Facility: CLINIC | Age: 24
End: 2019-08-01

## 2019-08-01 NOTE — LETTER
[facility]  [address]  [address] [place] [address]  [phone]          August 1, 2019    RE:  Esperanza Vidales                                                                                                                                                       [address] [place] [facility] [address]            To whom it may concern:    Esperanza Vidales was under my professional care for left sided low back pain with radiculopathy. She was seen on 11/16/18 and 11/20/18.        Sincerely,        Rajat Alvarenga DO

## 2019-08-01 NOTE — TELEPHONE ENCOUNTER
[facility]    Phone Message    May a detailed message be left on voicemail: yes    Reason for Call: Form or Letter   Type or form/letter needing completion: Need letter stating date of services 11/16 and 11/20 stating pt's diagnosis.  Provider: Dr. Alvarenga  Date form needed: asap  Once completed: Fax form to: [phone]      Action Taken: Message routed to:  [facility]

## 2019-08-29 ENCOUNTER — HOSPITAL ENCOUNTER (EMERGENCY)
Facility: CLINIC | Age: 24
Discharge: HOME | End: 2019-08-30
Attending: EMERGENCY MEDICINE | Admitting: EMERGENCY MEDICINE
Payer: COMMERCIAL

## 2019-08-29 DIAGNOSIS — R11.10 NON-INTRACTABLE VOMITING, PRESENCE OF NAUSEA NOT SPECIFIED, UNSPECIFIED VOMITING TYPE: ICD-10-CM

## 2019-08-29 DIAGNOSIS — F10.220 ALCOHOL DEPENDENCE WITH UNCOMPLICATED INTOXICATION (H): ICD-10-CM

## 2019-08-29 DIAGNOSIS — F10.929 ALCOHOLIC INTOXICATION WITH COMPLICATION (H): ICD-10-CM

## 2019-08-29 PROCEDURE — 96361 HYDRATE IV INFUSION ADD-ON: CPT

## 2019-08-29 PROCEDURE — 96374 THER/PROPH/DIAG INJ IV PUSH: CPT

## 2019-08-29 PROCEDURE — 82075 ASSAY OF BREATH ETHANOL: CPT

## 2019-08-29 PROCEDURE — 99285 EMERGENCY DEPT VISIT HI MDM: CPT | Mod: 25

## 2019-08-29 PROCEDURE — 99284 EMERGENCY DEPT VISIT MOD MDM: CPT | Mod: Z6 | Performed by: EMERGENCY MEDICINE

## 2019-08-29 NOTE — ED AVS SNAPSHOT
[facility], [place], Emergency Department  [address]  [facility] [place] [address]  Phone:  [phone]  Fax:  [phone]                                    Esperanza Vidales   MRN: [id number]    Department:  [facility], Emergency Department   Date of Visit:  8/29/2019           After Visit Summary Signature Page    I have received my discharge instructions, and my questions have been answered. I have discussed any challenges I see with this plan with the nurse or doctor.    ..........................................................................................................................................  Patient/Patient Representative Signature      ..........................................................................................................................................  Patient Representative Print Name and Relationship to Patient    ..................................................               ................................................  Date                                   Time    ..........................................................................................................................................  Reviewed by Signature/Title    ...................................................              ..............................................  Date                                               Time          22EPIC Rev 08/18

## 2019-08-30 VITALS
TEMPERATURE: 97.1 F | HEART RATE: 80 BPM | RESPIRATION RATE: 15 BRPM | DIASTOLIC BLOOD PRESSURE: 63 MMHG | OXYGEN SATURATION: 97 % | SYSTOLIC BLOOD PRESSURE: 111 MMHG

## 2019-08-30 LAB — ALCOHOL BREATH TEST: 0.09 (ref 0–0.01)

## 2019-08-30 PROCEDURE — 25000128 H RX IP 250 OP 636: Performed by: EMERGENCY MEDICINE

## 2019-08-30 PROCEDURE — 96374 THER/PROPH/DIAG INJ IV PUSH: CPT | Mod: 59

## 2019-08-30 PROCEDURE — 96361 HYDRATE IV INFUSION ADD-ON: CPT

## 2019-08-30 RX ORDER — ONDANSETRON 2 MG/ML
4 INJECTION INTRAMUSCULAR; INTRAVENOUS ONCE
Status: COMPLETED | OUTPATIENT
Start: 2019-08-30 | End: 2019-08-30

## 2019-08-30 RX ADMIN — SODIUM CHLORIDE 1000 ML: 9 INJECTION, SOLUTION INTRAVENOUS at 01:51

## 2019-08-30 RX ADMIN — ONDANSETRON 4 MG: 2 INJECTION INTRAMUSCULAR; INTRAVENOUS at 01:51

## 2019-08-30 ASSESSMENT — ENCOUNTER SYMPTOMS
EYE REDNESS: 0
FEVER: 0
DIFFICULTY URINATING: 0
COLOR CHANGE: 0
ABDOMINAL PAIN: 0
HEADACHES: 0
ARTHRALGIAS: 0
CONFUSION: 0
SHORTNESS OF BREATH: 0
NECK STIFFNESS: 0
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
## 2019-08-30 NOTE — DISCHARGE INSTRUCTIONS
Drink plenty of fluids.  Avoid excessive alcohol use.    Return to the emergency department for abdominal pain, recurrent vomiting, fever, or other concerns.

## 2020-03-11 ENCOUNTER — HEALTH MAINTENANCE LETTER (OUTPATIENT)
Age: 25
End: 2020-03-11

## 2020-12-27 ENCOUNTER — HEALTH MAINTENANCE LETTER (OUTPATIENT)
Age: 25
End: 2020-12-27

## 2021-04-25 ENCOUNTER — HEALTH MAINTENANCE LETTER (OUTPATIENT)
Age: 26
End: 2021-04-25

## 2021-10-09 ENCOUNTER — HEALTH MAINTENANCE LETTER (OUTPATIENT)
Age: 26
End: 2021-10-09

## 2022-05-21 ENCOUNTER — HEALTH MAINTENANCE LETTER (OUTPATIENT)
Age: 27
End: 2022-05-21

## 2022-09-17 ENCOUNTER — HEALTH MAINTENANCE LETTER (OUTPATIENT)
Age: 27
End: 2022-09-17

## 2023-06-04 ENCOUNTER — HEALTH MAINTENANCE LETTER (OUTPATIENT)
Age: 28
End: 2023-06-04

## (undated) DEVICE — Device

## (undated) DEVICE — SUCTION TIP YANKAUER STR K87

## (undated) DEVICE — GLOVE PROTEXIS POWDER FREE SMT 8.0  2D72PT80X

## (undated) DEVICE — ESU SUCTION CAUTERY 10FR FOOT CONTROL E2505-10FR

## (undated) DEVICE — ESU PENCIL W/COATED BLADE E2450H

## (undated) DEVICE — TUBING SUCTION 12"X1/4" N612

## (undated) DEVICE — SUCTION MANIFOLD NEPTUNE 2 SYS 4 PORT 0702-020-000

## (undated) DEVICE — LINEN TOWEL PACK X5 5464

## (undated) DEVICE — ESU GROUND PAD ADULT W/CORD E7507

## (undated) DEVICE — DRSG TELFA 3X8" 1238

## (undated) RX ORDER — FENTANYL CITRATE 50 UG/ML
INJECTION, SOLUTION INTRAMUSCULAR; INTRAVENOUS
Status: DISPENSED
Start: 2018-12-17

## (undated) RX ORDER — ACETAMINOPHEN 325 MG/1
TABLET ORAL
Status: DISPENSED
Start: 2018-12-17

## (undated) RX ORDER — PROPOFOL 10 MG/ML
INJECTION, EMULSION INTRAVENOUS
Status: DISPENSED
Start: 2018-12-17

## (undated) RX ORDER — GABAPENTIN 300 MG/1
CAPSULE ORAL
Status: DISPENSED
Start: 2018-12-17

## (undated) RX ORDER — LIDOCAINE HYDROCHLORIDE 20 MG/ML
INJECTION, SOLUTION EPIDURAL; INFILTRATION; INTRACAUDAL; PERINEURAL
Status: DISPENSED
Start: 2018-12-17

## (undated) RX ORDER — LIDOCAINE HYDROCHLORIDE 10 MG/ML
INJECTION, SOLUTION EPIDURAL; INFILTRATION; INTRACAUDAL; PERINEURAL
Status: DISPENSED
Start: 2018-12-17

## (undated) RX ORDER — DEXAMETHASONE SODIUM PHOSPHATE 10 MG/ML
INJECTION, SOLUTION INTRAMUSCULAR; INTRAVENOUS
Status: DISPENSED
Start: 2018-12-17

## (undated) RX ORDER — BUPIVACAINE HYDROCHLORIDE 2.5 MG/ML
INJECTION, SOLUTION INFILTRATION; PERINEURAL
Status: DISPENSED
Start: 2018-12-17

## (undated) RX ORDER — OXYCODONE HCL 5 MG/5 ML
SOLUTION, ORAL ORAL
Status: DISPENSED
Start: 2018-12-17

## (undated) RX ORDER — ONDANSETRON 2 MG/ML
INJECTION INTRAMUSCULAR; INTRAVENOUS
Status: DISPENSED
Start: 2018-12-17

## (undated) RX ORDER — GLYCOPYRROLATE 0.2 MG/ML
INJECTION INTRAMUSCULAR; INTRAVENOUS
Status: DISPENSED
Start: 2018-12-17